# Patient Record
Sex: FEMALE | Race: WHITE | Employment: PART TIME | URBAN - METROPOLITAN AREA
[De-identification: names, ages, dates, MRNs, and addresses within clinical notes are randomized per-mention and may not be internally consistent; named-entity substitution may affect disease eponyms.]

---

## 2018-06-14 ENCOUNTER — HOSPITAL ENCOUNTER (EMERGENCY)
Age: 23
Discharge: HOME | End: 2018-06-14

## 2018-06-14 ENCOUNTER — APPOINTMENT (OUTPATIENT)
Dept: GENERAL RADIOLOGY | Age: 23
End: 2018-06-14

## 2018-06-14 VITALS
DIASTOLIC BLOOD PRESSURE: 88 MMHG | HEIGHT: 60 IN | BODY MASS INDEX: 26.5 KG/M2 | WEIGHT: 135 LBS | RESPIRATION RATE: 16 BRPM | SYSTOLIC BLOOD PRESSURE: 127 MMHG | TEMPERATURE: 98.3 F | HEART RATE: 78 BPM | OXYGEN SATURATION: 98 %

## 2018-06-14 DIAGNOSIS — R05.9 COUGH: Primary | ICD-10-CM

## 2018-06-14 PROCEDURE — 99212 OFFICE O/P EST SF 10 MIN: CPT

## 2018-06-14 PROCEDURE — 71046 X-RAY EXAM CHEST 2 VIEWS: CPT

## 2018-06-14 RX ORDER — CETIRIZINE HYDROCHLORIDE 10 MG/1
10 TABLET ORAL DAILY
COMMUNITY
End: 2018-12-06

## 2018-06-14 RX ORDER — BENZONATATE 100 MG/1
100 CAPSULE ORAL 3 TIMES DAILY PRN
Qty: 21 CAPSULE | Refills: 0 | Status: SHIPPED | OUTPATIENT
Start: 2018-06-14 | End: 2018-06-21

## 2018-06-14 ASSESSMENT — PAIN SCALES - GENERAL: PAINLEVEL_OUTOF10: 5

## 2018-06-14 ASSESSMENT — PAIN DESCRIPTION - DESCRIPTORS: DESCRIPTORS: SORE

## 2018-06-14 ASSESSMENT — PAIN DESCRIPTION - LOCATION: LOCATION: THROAT

## 2018-12-06 ENCOUNTER — OFFICE VISIT (OUTPATIENT)
Dept: FAMILY MEDICINE CLINIC | Age: 23
End: 2018-12-06
Payer: OTHER GOVERNMENT

## 2018-12-06 VITALS
HEART RATE: 70 BPM | DIASTOLIC BLOOD PRESSURE: 78 MMHG | BODY MASS INDEX: 25.86 KG/M2 | RESPIRATION RATE: 12 BRPM | OXYGEN SATURATION: 98 % | SYSTOLIC BLOOD PRESSURE: 118 MMHG | WEIGHT: 137 LBS | TEMPERATURE: 98.6 F | HEIGHT: 61 IN

## 2018-12-06 DIAGNOSIS — Z71.6 ENCOUNTER FOR SMOKING CESSATION COUNSELING: ICD-10-CM

## 2018-12-06 DIAGNOSIS — J40 BRONCHITIS: Primary | ICD-10-CM

## 2018-12-06 DIAGNOSIS — F98.8 ATTENTION DEFICIT DISORDER (ADD) WITHOUT HYPERACTIVITY: ICD-10-CM

## 2018-12-06 PROCEDURE — 99203 OFFICE O/P NEW LOW 30 MIN: CPT | Performed by: NURSE PRACTITIONER

## 2018-12-06 RX ORDER — ALBUTEROL SULFATE 90 UG/1
2 AEROSOL, METERED RESPIRATORY (INHALATION) EVERY 6 HOURS PRN
Qty: 1 INHALER | Refills: 0 | Status: SHIPPED | OUTPATIENT
Start: 2018-12-06 | End: 2019-02-11

## 2018-12-06 RX ORDER — DEXTROAMPHETAMINE SACCHARATE, AMPHETAMINE ASPARTATE MONOHYDRATE, DEXTROAMPHETAMINE SULFATE AND AMPHETAMINE SULFATE 2.5; 2.5; 2.5; 2.5 MG/1; MG/1; MG/1; MG/1
CAPSULE, EXTENDED RELEASE ORAL
Refills: 0 | COMMUNITY
Start: 2018-11-26 | End: 2019-02-11

## 2018-12-06 RX ORDER — FLUCONAZOLE 150 MG/1
150 TABLET ORAL ONCE
Qty: 1 TABLET | Refills: 1 | Status: SHIPPED | OUTPATIENT
Start: 2018-12-06 | End: 2018-12-06

## 2018-12-06 RX ORDER — BUPROPION HYDROCHLORIDE 150 MG/1
150 TABLET, EXTENDED RELEASE ORAL 2 TIMES DAILY
Qty: 60 TABLET | Refills: 3 | Status: SHIPPED | OUTPATIENT
Start: 2018-12-06 | End: 2019-02-11

## 2018-12-06 RX ORDER — AMOXICILLIN AND CLAVULANATE POTASSIUM 875; 125 MG/1; MG/1
1 TABLET, FILM COATED ORAL 2 TIMES DAILY
Qty: 20 TABLET | Refills: 0 | Status: SHIPPED | OUTPATIENT
Start: 2018-12-06 | End: 2018-12-16

## 2018-12-06 ASSESSMENT — PATIENT HEALTH QUESTIONNAIRE - PHQ9
2. FEELING DOWN, DEPRESSED OR HOPELESS: 0
SUM OF ALL RESPONSES TO PHQ QUESTIONS 1-9: 0
1. LITTLE INTEREST OR PLEASURE IN DOING THINGS: 0
SUM OF ALL RESPONSES TO PHQ QUESTIONS 1-9: 0
SUM OF ALL RESPONSES TO PHQ9 QUESTIONS 1 & 2: 0

## 2018-12-06 ASSESSMENT — ENCOUNTER SYMPTOMS
SINUS PRESSURE: 1
COUGH: 1
SHORTNESS OF BREATH: 1
DIARRHEA: 1
VOMITING: 0
SINUS PAIN: 1
WHEEZING: 1
SORE THROAT: 0
NAUSEA: 0

## 2018-12-06 NOTE — LETTER
[facility]  [address]  Phone: [phone]  Fax: [address], APRN - CNP        December 6, 2018     Patient: Eugenie Sandoval   YOB: 1995   Date of Visit: 12/6/2018       To Whom It May Concern: It is my medical opinion that Pulmonx should have no activity or physical training this weekend until Tuesday due to bronchitis. If you have any questions or concerns, please don't hesitate to call.     Sincerely,        Felicia Parisi, APRN - CNP

## 2018-12-06 NOTE — PROGRESS NOTES
amphetamine-dextroamphetamine (ADDERALL XR) 10 MG extended release capsule TAKE ONE CAPSULE BY MOUTH EVERY DAY Yes Historical Provider, MD   buPROPion (WELLBUTRIN SR) 150 MG extended release tablet Take 1 tablet by mouth 2 times daily Start with one tablet daily for 3 days then increase to 2 times daily Yes NEHEMIAS Hicks CNP   albuterol sulfate  (90 Base) MCG/ACT inhaler Inhale 2 puffs into the lungs every 6 hours as needed for Wheezing Yes NEHEMIAS Hicks CNP   amoxicillin-clavulanate (AUGMENTIN) 875-125 MG per tablet Take 1 tablet by mouth 2 times daily for 10 days Yes NEHEMIAS Finnegan CNP       Review of Systems   Constitutional: Positive for fatigue. Negative for activity change, appetite change, chills and diaphoresis. HENT: Positive for congestion, ear pain, postnasal drip, sinus pain and sinus pressure. Negative for ear discharge and sore throat. Respiratory: Positive for cough (worse at night and in morning), shortness of breath and wheezing. Cardiovascular: Negative for chest pain and palpitations. Gastrointestinal: Positive for diarrhea. Negative for nausea and vomiting. Genitourinary: Negative for difficulty urinating. Neurological: Positive for headaches (sinus). Negative for dizziness. Psychiatric/Behavioral: Negative for sleep disturbance. The patient is not nervous/anxious. Physical Exam   Constitutional: She is oriented to person, place, and time. She appears well-developed and well-nourished. HENT:   Head: Normocephalic and atraumatic. TMs with good light reflex bilaterally, nasal turbinates erythematous and boggy, posterior pharynx erythematous and drainage noted. Neck: No tracheal deviation present. No thyromegaly present. Cardiovascular: Normal rate and regular rhythm. No murmur heard. Pulmonary/Chest: Effort normal and breath sounds normal. No respiratory distress. She has no wheezes. She has no rales. Abdominal: Soft.  Bowel

## 2019-02-11 ENCOUNTER — OFFICE VISIT (OUTPATIENT)
Dept: FAMILY MEDICINE CLINIC | Age: 24
End: 2019-02-11
Payer: OTHER GOVERNMENT

## 2019-02-11 VITALS
BODY MASS INDEX: 25.86 KG/M2 | OXYGEN SATURATION: 99 % | RESPIRATION RATE: 16 BRPM | SYSTOLIC BLOOD PRESSURE: 122 MMHG | WEIGHT: 137 LBS | DIASTOLIC BLOOD PRESSURE: 74 MMHG | HEIGHT: 61 IN | TEMPERATURE: 98.3 F | HEART RATE: 88 BPM

## 2019-02-11 DIAGNOSIS — J01.90 ACUTE SINUSITIS, RECURRENCE NOT SPECIFIED, UNSPECIFIED LOCATION: Primary | ICD-10-CM

## 2019-02-11 DIAGNOSIS — J35.01 CHRONIC TONSILLITIS: ICD-10-CM

## 2019-02-11 PROCEDURE — 99213 OFFICE O/P EST LOW 20 MIN: CPT | Performed by: NURSE PRACTITIONER

## 2019-02-11 RX ORDER — DEXTROAMPHETAMINE SACCHARATE, AMPHETAMINE ASPARTATE MONOHYDRATE, DEXTROAMPHETAMINE SULFATE AND AMPHETAMINE SULFATE 5; 5; 5; 5 MG/1; MG/1; MG/1; MG/1
20 CAPSULE, EXTENDED RELEASE ORAL EVERY MORNING
COMMUNITY
End: 2021-03-03

## 2019-02-11 RX ORDER — DEXTROMETHORPHAN HYDROBROMIDE AND PROMETHAZINE HYDROCHLORIDE 15; 6.25 MG/5ML; MG/5ML
5 SYRUP ORAL 4 TIMES DAILY PRN
Qty: 120 ML | Refills: 0 | Status: SHIPPED | OUTPATIENT
Start: 2019-02-11 | End: 2019-02-21

## 2019-02-11 RX ORDER — DOXYCYCLINE HYCLATE 100 MG
100 TABLET ORAL 2 TIMES DAILY
Qty: 14 TABLET | Refills: 0 | Status: SHIPPED | OUTPATIENT
Start: 2019-02-11 | End: 2019-02-18

## 2019-02-11 ASSESSMENT — ENCOUNTER SYMPTOMS
SORE THROAT: 0
SINUS PRESSURE: 1
CONSTIPATION: 0
COUGH: 1
SINUS PAIN: 1
DIARRHEA: 0
CHEST TIGHTNESS: 1
VOMITING: 1
WHEEZING: 0
SHORTNESS OF BREATH: 0
NAUSEA: 0

## 2019-02-11 ASSESSMENT — PATIENT HEALTH QUESTIONNAIRE - PHQ9
2. FEELING DOWN, DEPRESSED OR HOPELESS: 0
SUM OF ALL RESPONSES TO PHQ QUESTIONS 1-9: 0
SUM OF ALL RESPONSES TO PHQ9 QUESTIONS 1 & 2: 0
1. LITTLE INTEREST OR PLEASURE IN DOING THINGS: 0
SUM OF ALL RESPONSES TO PHQ QUESTIONS 1-9: 0

## 2019-02-19 ENCOUNTER — TELEPHONE (OUTPATIENT)
Dept: FAMILY MEDICINE CLINIC | Age: 24
End: 2019-02-19

## 2019-02-19 RX ORDER — BENZONATATE 100 MG/1
100 CAPSULE ORAL 3 TIMES DAILY PRN
Qty: 30 CAPSULE | Refills: 0 | Status: SHIPPED | OUTPATIENT
Start: 2019-02-19 | End: 2019-02-26

## 2019-08-15 ENCOUNTER — APPOINTMENT (OUTPATIENT)
Dept: CT IMAGING | Age: 24
End: 2019-08-15
Payer: OTHER GOVERNMENT

## 2019-08-15 ENCOUNTER — HOSPITAL ENCOUNTER (EMERGENCY)
Age: 24
Discharge: HOME | End: 2019-08-15
Attending: EMERGENCY MEDICINE
Payer: OTHER GOVERNMENT

## 2019-08-15 VITALS
HEIGHT: 63 IN | WEIGHT: 135 LBS | DIASTOLIC BLOOD PRESSURE: 64 MMHG | BODY MASS INDEX: 23.92 KG/M2 | TEMPERATURE: 98.1 F | SYSTOLIC BLOOD PRESSURE: 104 MMHG | HEART RATE: 64 BPM | OXYGEN SATURATION: 99 % | RESPIRATION RATE: 18 BRPM

## 2019-08-15 DIAGNOSIS — J36 PERITONSILLAR ABSCESS: Primary | ICD-10-CM

## 2019-08-15 LAB
ANION GAP SERPL CALCULATED.3IONS-SCNC: 9 MMOL/L (ref 7–16)
BASOPHILS ABSOLUTE: 0.02 E9/L (ref 0–0.2)
BASOPHILS RELATIVE PERCENT: 0.2 % (ref 0–2)
BUN BLDV-MCNC: 11 MG/DL (ref 6–20)
CALCIUM SERPL-MCNC: 9 MG/DL (ref 8.6–10.2)
CHLORIDE BLD-SCNC: 105 MMOL/L (ref 98–107)
CO2: 24 MMOL/L (ref 22–29)
CREAT SERPL-MCNC: 0.7 MG/DL (ref 0.5–1)
EOSINOPHILS ABSOLUTE: 0.29 E9/L (ref 0.05–0.5)
EOSINOPHILS RELATIVE PERCENT: 2.8 % (ref 0–6)
GFR AFRICAN AMERICAN: >60
GFR NON-AFRICAN AMERICAN: >60 ML/MIN/1.73
GLUCOSE BLD-MCNC: 97 MG/DL (ref 74–99)
HCG, URINE, POC: NEGATIVE
HCT VFR BLD CALC: 37.8 % (ref 34–48)
HEMOGLOBIN: 12.8 G/DL (ref 11.5–15.5)
IMMATURE GRANULOCYTES #: 0.05 E9/L
IMMATURE GRANULOCYTES %: 0.5 % (ref 0–5)
LACTIC ACID, SEPSIS: 0.6 MMOL/L (ref 0.5–1.9)
LYMPHOCYTES ABSOLUTE: 2.37 E9/L (ref 1.5–4)
LYMPHOCYTES RELATIVE PERCENT: 23 % (ref 20–42)
Lab: NORMAL
MCH RBC QN AUTO: 32.7 PG (ref 26–35)
MCHC RBC AUTO-ENTMCNC: 33.9 % (ref 32–34.5)
MCV RBC AUTO: 96.4 FL (ref 80–99.9)
MONOCYTES ABSOLUTE: 0.67 E9/L (ref 0.1–0.95)
MONOCYTES RELATIVE PERCENT: 6.5 % (ref 2–12)
NEGATIVE QC PASS/FAIL: NORMAL
NEUTROPHILS ABSOLUTE: 6.91 E9/L (ref 1.8–7.3)
NEUTROPHILS RELATIVE PERCENT: 67 % (ref 43–80)
PDW BLD-RTO: 13.7 FL (ref 11.5–15)
PLATELET # BLD: 351 E9/L (ref 130–450)
PMV BLD AUTO: 8.9 FL (ref 7–12)
POSITIVE QC PASS/FAIL: NORMAL
POTASSIUM REFLEX MAGNESIUM: 3.9 MMOL/L (ref 3.5–5)
RBC # BLD: 3.92 E12/L (ref 3.5–5.5)
SODIUM BLD-SCNC: 138 MMOL/L (ref 132–146)
WBC # BLD: 10.3 E9/L (ref 4.5–11.5)

## 2019-08-15 PROCEDURE — 2500000003 HC RX 250 WO HCPCS: Performed by: EMERGENCY MEDICINE

## 2019-08-15 PROCEDURE — 96365 THER/PROPH/DIAG IV INF INIT: CPT

## 2019-08-15 PROCEDURE — 6360000004 HC RX CONTRAST MEDICATION: Performed by: RADIOLOGY

## 2019-08-15 PROCEDURE — 83605 ASSAY OF LACTIC ACID: CPT

## 2019-08-15 PROCEDURE — 85025 COMPLETE CBC W/AUTO DIFF WBC: CPT

## 2019-08-15 PROCEDURE — 6360000002 HC RX W HCPCS: Performed by: EMERGENCY MEDICINE

## 2019-08-15 PROCEDURE — 99283 EMERGENCY DEPT VISIT LOW MDM: CPT

## 2019-08-15 PROCEDURE — 6370000000 HC RX 637 (ALT 250 FOR IP): Performed by: EMERGENCY MEDICINE

## 2019-08-15 PROCEDURE — 70491 CT SOFT TISSUE NECK W/DYE: CPT

## 2019-08-15 PROCEDURE — 2580000003 HC RX 258: Performed by: RADIOLOGY

## 2019-08-15 PROCEDURE — 96372 THER/PROPH/DIAG INJ SC/IM: CPT

## 2019-08-15 PROCEDURE — 80048 BASIC METABOLIC PNL TOTAL CA: CPT

## 2019-08-15 RX ORDER — CLINDAMYCIN HYDROCHLORIDE 300 MG/1
300 CAPSULE ORAL 3 TIMES DAILY
Qty: 30 CAPSULE | Refills: 0 | Status: SHIPPED | OUTPATIENT
Start: 2019-08-15 | End: 2019-08-25

## 2019-08-15 RX ORDER — DEXAMETHASONE SODIUM PHOSPHATE 10 MG/ML
10 INJECTION INTRAMUSCULAR; INTRAVENOUS ONCE
Status: COMPLETED | OUTPATIENT
Start: 2019-08-15 | End: 2019-08-15

## 2019-08-15 RX ORDER — CLINDAMYCIN PHOSPHATE 600 MG/50ML
600 INJECTION INTRAVENOUS ONCE
Status: COMPLETED | OUTPATIENT
Start: 2019-08-15 | End: 2019-08-15

## 2019-08-15 RX ORDER — HYDROCODONE BITARTRATE AND ACETAMINOPHEN 5; 325 MG/1; MG/1
1 TABLET ORAL ONCE
Status: COMPLETED | OUTPATIENT
Start: 2019-08-15 | End: 2019-08-15

## 2019-08-15 RX ORDER — PREDNISONE 10 MG/1
TABLET ORAL
Qty: 20 TABLET | Refills: 0 | Status: SHIPPED | OUTPATIENT
Start: 2019-08-15 | End: 2019-08-25

## 2019-08-15 RX ORDER — SODIUM CHLORIDE 0.9 % (FLUSH) 0.9 %
10 SYRINGE (ML) INJECTION PRN
Status: COMPLETED | OUTPATIENT
Start: 2019-08-15 | End: 2019-08-15

## 2019-08-15 RX ORDER — GREEN TEA/HOODIA GORDONII 315-12.5MG
1 CAPSULE ORAL DAILY
Qty: 30 TABLET | Refills: 0 | Status: SHIPPED | OUTPATIENT
Start: 2019-08-15 | End: 2019-08-30

## 2019-08-15 RX ADMIN — CLINDAMYCIN PHOSPHATE 600 MG: 600 INJECTION, SOLUTION INTRAVENOUS at 23:07

## 2019-08-15 RX ADMIN — HYDROCODONE BITARTRATE AND ACETAMINOPHEN 1 TABLET: 5; 325 TABLET ORAL at 21:31

## 2019-08-15 RX ADMIN — Medication 10 ML: at 21:55

## 2019-08-15 RX ADMIN — IOPAMIDOL 70 ML: 755 INJECTION, SOLUTION INTRAVENOUS at 21:55

## 2019-08-15 RX ADMIN — DEXAMETHASONE SODIUM PHOSPHATE 10 MG: 10 INJECTION INTRAMUSCULAR; INTRAVENOUS at 22:59

## 2019-08-15 ASSESSMENT — ENCOUNTER SYMPTOMS
NAUSEA: 1
TROUBLE SWALLOWING: 0
SINUS PRESSURE: 0
VOMITING: 0
SHORTNESS OF BREATH: 0
COUGH: 0
RHINORRHEA: 0
ABDOMINAL PAIN: 0
COLOR CHANGE: 0
VOICE CHANGE: 1
SORE THROAT: 1
DIARRHEA: 0
WHEEZING: 0
EYE PAIN: 0
EYE REDNESS: 0

## 2019-08-15 ASSESSMENT — PAIN DESCRIPTION - DESCRIPTORS: DESCRIPTORS: SORE

## 2019-08-15 ASSESSMENT — PAIN DESCRIPTION - ONSET: ONSET: ON-GOING

## 2019-08-15 ASSESSMENT — PAIN DESCRIPTION - FREQUENCY: FREQUENCY: CONTINUOUS

## 2019-08-15 ASSESSMENT — PAIN SCALES - GENERAL
PAINLEVEL_OUTOF10: 5
PAINLEVEL_OUTOF10: 2
PAINLEVEL_OUTOF10: 8

## 2019-08-15 ASSESSMENT — PAIN DESCRIPTION - PROGRESSION: CLINICAL_PROGRESSION: GRADUALLY IMPROVING

## 2019-08-15 ASSESSMENT — PAIN DESCRIPTION - LOCATION: LOCATION: THROAT

## 2019-08-15 ASSESSMENT — PAIN DESCRIPTION - PAIN TYPE: TYPE: ACUTE PAIN

## 2019-08-15 ASSESSMENT — PAIN DESCRIPTION - ORIENTATION: ORIENTATION: LEFT

## 2019-08-16 ENCOUNTER — TELEPHONE (OUTPATIENT)
Dept: ENT CLINIC | Age: 24
End: 2019-08-16

## 2019-08-19 NOTE — TELEPHONE ENCOUNTER
Spoke with patient on Friday-she was doing better and apt scheduled for Tuesday per Dr. Brendan Oliveira.

## 2019-08-20 ENCOUNTER — OFFICE VISIT (OUTPATIENT)
Dept: ENT CLINIC | Age: 24
End: 2019-08-20
Payer: OTHER GOVERNMENT

## 2019-08-20 VITALS
HEIGHT: 61 IN | BODY MASS INDEX: 25.49 KG/M2 | WEIGHT: 135 LBS | HEART RATE: 108 BPM | SYSTOLIC BLOOD PRESSURE: 122 MMHG | DIASTOLIC BLOOD PRESSURE: 83 MMHG

## 2019-08-20 DIAGNOSIS — J35.01 CHRONIC TONSILLITIS: Primary | ICD-10-CM

## 2019-08-20 PROCEDURE — 99204 OFFICE O/P NEW MOD 45 MIN: CPT | Performed by: OTOLARYNGOLOGY

## 2019-08-20 ASSESSMENT — ENCOUNTER SYMPTOMS
NAUSEA: 0
VOICE CHANGE: 0
PHOTOPHOBIA: 0
STRIDOR: 0
VOMITING: 0
SHORTNESS OF BREATH: 0
SORE THROAT: 0

## 2019-08-20 NOTE — PATIENT INSTRUCTIONS
Thank you for choosing our [facility] or [place]  [facility]. We are committed to your medical treatment and  care. If you need to reschedule or cancel your surgery or follow up  appointment, please call the surgery scheduler at [phone] option 3. INSTRUCTIONS FOR SURGERY 10/24/2019    Nothing to eat or drink after midnight the night before surgery unless surgery is at [facility] or otherwise instructed by the hospital.    DO NOT TAKE ANY ASPIRIN PRODUCTS 7 days prior to surgery-unless required by your cardiologist or primary care physician. Tylenol only. No Advil, Motrin, Aleve, or Ibuprofen    Any illegal drugs in your system (including Marijuana even if legally prescribed) will result in your surgery being cancelled. Please be sure to check with our office or the hospital on time frame for the drugs to be out of your system. Should your insurance change at any time you must contact our office. Failure to do so may result in your surgery being rescheduled. If you need paperwork filled out for work, you must give the office 2 weeks to complete and submit the forms.     [address], [address] [facility] will call you a couple days prior to your surgery and give you further instructions, if any questions call them at [phone]

## 2019-10-21 ENCOUNTER — TELEPHONE (OUTPATIENT)
Dept: ENT CLINIC | Age: 24
End: 2019-10-21

## 2020-11-13 ENCOUNTER — OFFICE VISIT (OUTPATIENT)
Dept: ENT CLINIC | Age: 25
End: 2020-11-13
Payer: OTHER GOVERNMENT

## 2020-11-13 VITALS — HEIGHT: 61 IN | WEIGHT: 140 LBS | BODY MASS INDEX: 26.43 KG/M2 | TEMPERATURE: 97.5 F

## 2020-11-13 PROCEDURE — 99213 OFFICE O/P EST LOW 20 MIN: CPT | Performed by: OTOLARYNGOLOGY

## 2020-11-13 ASSESSMENT — ENCOUNTER SYMPTOMS
COLOR CHANGE: 0
EYE DISCHARGE: 0
APNEA: 0
VOMITING: 0
NAUSEA: 0
PHOTOPHOBIA: 0
GASTROINTESTINAL NEGATIVE: 1
STRIDOR: 0
EYE PAIN: 0
SORE THROAT: 1
ABDOMINAL PAIN: 0
SHORTNESS OF BREATH: 0
VOICE CHANGE: 0
DIARRHEA: 0
EYES NEGATIVE: 1
RESPIRATORY NEGATIVE: 1
CHEST TIGHTNESS: 0

## 2020-11-13 NOTE — PROGRESS NOTES
Subjective:      Patient ID:  Axel Chang is a 30 y.o. female. HPI:    Patient presents today for recheck of her tonsil surgery, she was cancelled with covid earlier this year. No changes and would like them out. . Condition has been present for 1 year(s). Pt has had infections since the last visit and would llike to get the tonsils removed. Also having issues breathing through her nose and would like to talk about getting that addressed.        Past Medical History:   Diagnosis Date    ADHD (attention deficit hyperactivity disorder)      Past Surgical History:   Procedure Laterality Date    BREAST SURGERY      reduction     Family History   Problem Relation Age of Onset    Heart Disease Mother     Other Mother     Substance Abuse Mother     Other Sister         tremors    Stroke Maternal Grandmother     Heart Disease Maternal Grandfather     Other Maternal Grandfather         alzheimers     Social History     Socioeconomic History    Marital status: Single     Spouse name: None    Number of children: None    Years of education: None    Highest education level: None   Occupational History    None   Social Needs    Financial resource strain: None    Food insecurity     Worry: None     Inability: None    Transportation needs     Medical: None     Non-medical: None   Tobacco Use    Smoking status: Current Every Day Smoker     Packs/day: 0.50     Years: 4.00     Pack years: 2.00     Types: Cigarettes    Smokeless tobacco: Never Used   Substance and Sexual Activity    Alcohol use: Not Currently     Alcohol/week: 4.0 standard drinks     Types: 4 Glasses of wine per week    Drug use: No    Sexual activity: Never   Lifestyle    Physical activity     Days per week: None     Minutes per session: None    Stress: None   Relationships    Social connections     Talks on phone: None     Gets together: None     Attends Holiness service: None     Active member of club or organization: None     Attends meetings of clubs or organizations: None     Relationship status: None    Intimate partner violence     Fear of current or ex partner: None     Emotionally abused: None     Physically abused: None     Forced sexual activity: None   Other Topics Concern    None   Social History Narrative    None     Allergies   Allergen Reactions    Amoxicillin Rash       Review of Systems   Constitutional: Negative. Negative for appetite change, chills and fever. HENT: Positive for congestion and sore throat. Negative for voice change. Eyes: Negative. Negative for photophobia, pain, discharge and visual disturbance. Respiratory: Negative. Negative for apnea, chest tightness, shortness of breath and stridor. Cardiovascular: Negative. Negative for chest pain, palpitations and leg swelling. Gastrointestinal: Negative. Negative for abdominal pain, diarrhea, nausea and vomiting. Endocrine: Negative for cold intolerance, heat intolerance and polydipsia. Genitourinary: Negative. Negative for dysuria, flank pain and hematuria. Musculoskeletal: Negative. Negative for arthralgias, gait problem and neck pain. Skin: Negative. Negative for color change, pallor, rash and wound. Allergic/Immunologic: Negative for environmental allergies, food allergies and immunocompromised state. Neurological: Negative. Negative for dizziness, numbness and headaches. Hematological: Negative for adenopathy. Psychiatric/Behavioral: Negative. Negative for agitation, behavioral problems and hallucinations. All other systems reviewed and are negative. Objective:     Vitals:    11/13/20 0844   Temp: 97.5 °F (36.4 °C)     Physical Exam  Vitals signs and nursing note reviewed. Constitutional:       Appearance: She is well-developed. HENT:      Head: Normocephalic and atraumatic.       Right Ear: Hearing, tympanic membrane, ear canal and external ear normal.      Left Ear: Hearing, tympanic membrane, ear canal and external ear normal.      Nose: Nose normal.      Mouth/Throat:      Lips: Pink. Mouth: Mucous membranes are moist. Injury present. Tongue: No lesions. Palate: No mass. Tonsils: No tonsillar exudate. 3+ on the right. 3+ on the left. Eyes:      Conjunctiva/sclera: Conjunctivae normal.      Pupils: Pupils are equal, round, and reactive to light. Neck:      Musculoskeletal: Normal range of motion and neck supple. Cardiovascular:      Rate and Rhythm: Normal rate and regular rhythm. Heart sounds: Normal heart sounds. Pulmonary:      Effort: Pulmonary effort is normal.      Breath sounds: Normal breath sounds. Abdominal:      General: Bowel sounds are normal.      Palpations: Abdomen is soft. Skin:     General: Skin is warm and dry. Neurological:      Mental Status: She is alert and oriented to person, place, and time. Assessment:       Diagnosis Orders   1. DNS (deviated nasal septum)     2. Chronic tonsillitis                Plan:      I recommend:    Tonsillectomy and adenoidectomy. I will not keep the patient overnight for observation after surgery  The procedure risks and benefits were discussed with the patient and family including:      --Bleeding occurs in 1 to 4% of patients  --Poor speech (hyper nasal speech) occurs in 1/3000 patients. --Nasopharyngeal Stenosis  --Chipped Teeth  --Electrocautery Ahn  --Death      Pt and family understood and decided to proceed with the surgery. Follow up 2 weeks after surgery         I also discussed septoplasty with pt because she is having a hard time running and getting enough air in her nose so we will discuss this at the post op visit and possibly set surgery up after that.

## 2020-11-13 NOTE — LETTER
[facility]  [address] [facility]  [address] [address]  Phone: [phone]  Fax: [address],         November 13, 2020     Patient: Kadie Lopez   YOB: 1995   Date of Visit: 11/13/2020       To Whom it May Concern:    Apolinar Hough was seen in my clinic on 11/13/2020. She is scheduled for surgery January 18,2020. Patient will follow up February 2,2020. Patient is restricted to no bending, lifting over 10lbs, no strenous activity, physical labor from 11/13/2020-2/2/2020. If you have any questions or concerns, please don't hesitate to call.     Sincerely,     Sánchez Dominique DO

## 2020-11-13 NOTE — PATIENT INSTRUCTIONS
[facility] practice. We are committed to your medical treatment and  care. If you need to reschedule or cancel your surgery or follow up  appointment, please call the surgery scheduler at [phone]. INSTRUCTIONS FOR SURGERY Tonsillectomy & Adenoidectomy    Nothing to eat or drink after midnight the night before surgery unless surgery is at [facility] or otherwise instructed by the hospital.    DO NOT TAKE ANY ASPIRIN PRODUCTS 7 days prior to surgery-unless required by your cardiologist or primary care physician. Tylenol only. No Advil, Motrin, Aleve, or Ibuprofen    Any illegal drugs in your system (including Marijuana even if legally prescribed) will result in your surgery being cancelled. Please be sure to check with our office or the hospital on time frame for the drugs to be out of your system. Should your insurance change at any time you must contact our office. Failure to do so may result in your surgery being rescheduled. If you need paperwork filled out for work, you must give the office 2 weeks to complete and submit the forms.       [address], [address] [facility] you a couple days prior to your surgery and give you further instructions, if any questions call them at [phone]

## 2021-02-08 ENCOUNTER — TELEPHONE (OUTPATIENT)
Dept: ENT CLINIC | Age: 26
End: 2021-02-08

## 2021-02-08 NOTE — TELEPHONE ENCOUNTER
Prior Authorization Form:      DEMOGRAPHICS:                     Patient Name:  Debora Meeks  Patient :  1995            Insurance:  Payor:  EAST / Plan:  EAST  / Product Type: *No Product type* /   Insurance ID Number:    Payor/Plan Subscr  Sex Relation Sub.  Ins. ID Effective Group Num   1. ARMANDO MAE 1995 Female Self [id number]                                    [address]         DIAGNOSIS & PROCEDURE:                       Procedure/Operation: Tonsillectomy/Adenoidectomy           CPT Code: 61720    Diagnosis:  Chronic Tonsillitis,Adenoid Hypertrophy    ICD10 Code: J35.01    Location:  [address] [place] [address]    Surgeon:  Bethany Ocampo INFORMATION:                          Date: 3/8/2021    Time: N/A              Anesthesia:  General                                                       Status:  Outpatient   Outpatient       Special Comments:  N/A       Electronically signed by Abby Thacker MA on 2021 at 3:48 PM

## 2021-03-03 ENCOUNTER — HOSPITAL ENCOUNTER (OUTPATIENT)
Age: 26
Discharge: HOME | End: 2021-03-05
Payer: OTHER GOVERNMENT

## 2021-03-03 DIAGNOSIS — Z01.818 PREOP TESTING: ICD-10-CM

## 2021-03-03 PROCEDURE — U0003 INFECTIOUS AGENT DETECTION BY NUCLEIC ACID (DNA OR RNA); SEVERE ACUTE RESPIRATORY SYNDROME CORONAVIRUS 2 (SARS-COV-2) (CORONAVIRUS DISEASE [COVID-19]), AMPLIFIED PROBE TECHNIQUE, MAKING USE OF HIGH THROUGHPUT TECHNOLOGIES AS DESCRIBED BY CMS-2020-01-R: HCPCS

## 2021-03-03 RX ORDER — IBUPROFEN 200 MG
200 TABLET ORAL EVERY 6 HOURS PRN
COMMUNITY

## 2021-03-03 NOTE — PROGRESS NOTES
Cb PRE-ADMISSION TESTING INSTRUCTIONS    The Preadmission Testing patient is instructed accordingly using the following criteria (check applicable):    ARRIVAL INSTRUCTIONS:  [x] Parking the day of Surgery is located in the Main Entrance lot. Upon entering the door, make an immediate right to the surgery reception desk    [x] Bring photo ID and insurance card    [] Bring in a copy of Living will or Durable Power of  papers. [x] Please be sure to arrange for responsible adult to provide transportation to and from the hospital    [x] Please arrange for responsible adult to be with you for the 24 hour period post procedure due to having anesthesia      GENERAL INSTRUCTIONS:    [x] Nothing by mouth after midnight, including gum, candy, mints or water    [x] You may brush your teeth, but do not swallow any water    [x] Take medications as instructed with 1-2 oz of water    [] Stop herbal supplements and vitamins 5 days prior to procedure    [x] Follow preop dosing of blood thinners per physician instructions    [] Take 1/2 dose of evening insulin, but no insulin after midnight    [] No oral diabetic medications after midnight    [] If diabetic and have low blood sugar or feel symptomatic, take 1-2oz apple juice only    [] Bring inhalers day of surgery    [] Bring C-PAP/ Bi-Pap day of surgery    [x] Bring urine specimen day of surgery    [x] Shower or bath with soap, lather and rinse well, AM of Surgery, no lotion, powders or creams to surgical site    [x] Follow bowel prep as instructed per surgeon    [x] No tobacco products within 24 hours of surgery     [x] No alcohol or illegal drug use within 24 hours of surgery.     [x] Jewelry, body piercing's, eyeglasses, contact lenses and dentures are not permitted into surgery (bring cases)      [x] Please do not wear any nail polish, make up or hair products on the day of surgery    [x] You can expect a call the business day prior to procedure to notify you if your arrival time changes    [x] If you receive a survey after surgery we would greatly appreciate your comments    [] Parent/guardian of a minor must accompany their child and remain on the premises  the entire time they are under our care     [] Pediatric patients may bring favorite toy, blanket or comfort item with them    [] A caregiver or family member must remain with the patient during their stay if they are mentally handicapped, have dementia, disoriented or unable to use a call light or would be a safety concern if left unattended    [x] Please notify surgeon if you develop any illness between now and time of surgery (cold, cough, sore throat, fever, nausea, vomiting) or any signs of infections  including skin, wounds, and dental.    [x]  The Outpatient Pharmacy is available to fill your prescription here on your day of surgery, ask your preop nurse for details    [x] Other instructions: Wear comfortable clothing    EDUCATIONAL MATERIALS PROVIDED:    [] PAT Preoperative Education Packet/Booklet     [] Medication List    [] Transfusion bracelet applied with instructions    [] Shower with soap, lather and rinse well, and use CHG wipes provided the evening before surgery as instructed    [] Incentive spirometer with instructions        Have you been tested for COVID  No (to be done 3-3-21)           Have you been told you were positive for COVID No  Have you had any known exposure to someone that is positive for COVID No  Do you have a cough                   No              Do you have shortness of breath No                 Do you have a sore throat            No                Are you having chills                    No                Are you having muscle aches. No                    Please come to the hospital wearing a mask and have your significant other wear a mask as well.   Both of you should check your temperature before leaving to come here,  if it is 100 or higher please call [phone] for instruction.

## 2021-03-04 LAB
SARS-COV-2: NOT DETECTED
SOURCE: NORMAL

## 2021-03-07 ASSESSMENT — ENCOUNTER SYMPTOMS
SHORTNESS OF BREATH: 0
VOICE CHANGE: 0
STRIDOR: 0
PHOTOPHOBIA: 0
VOMITING: 0
APNEA: 0
EYE DISCHARGE: 0
EYE PAIN: 0
DIARRHEA: 0
NAUSEA: 0
RESPIRATORY NEGATIVE: 1
SORE THROAT: 1
CHEST TIGHTNESS: 0
GASTROINTESTINAL NEGATIVE: 1
ABDOMINAL PAIN: 0
EYES NEGATIVE: 1
COLOR CHANGE: 0

## 2021-03-07 NOTE — H&P
Subjective:      Patient ID:  Jaclynn Soulier is a 30 y.o. female. HPI:    Patient presents today for recheck of her tonsil surgery, she was cancelled with covid earlier this year. No changes and would like them out. . Condition has been present for 1 year(s). Pt has had infections since the last visit and would llike to get the tonsils removed. Also having issues breathing through her nose and would like to talk about getting that addressed.        Past Medical History:   Diagnosis Date    ADHD (attention deficit hyperactivity disorder)     Peritonsillar abscess      Past Surgical History:   Procedure Laterality Date    BREAST SURGERY  2013    reduction     Family History   Problem Relation Age of Onset    Heart Disease Mother     Other Mother     Substance Abuse Mother     Other Sister         tremors    Stroke Maternal Grandmother     Heart Disease Maternal Grandfather     Other Maternal Grandfather         alzheimers     Social History     Socioeconomic History    Marital status: Single     Spouse name: None    Number of children: None    Years of education: None    Highest education level: None   Occupational History    None   Social Needs    Financial resource strain: None    Food insecurity     Worry: None     Inability: None    Transportation needs     Medical: None     Non-medical: None   Tobacco Use    Smoking status: Current Every Day Smoker     Packs/day: 0.50     Years: 4.00     Pack years: 2.00     Types: Cigarettes    Smokeless tobacco: Never Used   Substance and Sexual Activity    Alcohol use: Yes     Comment: social    Drug use: No    Sexual activity: Never   Lifestyle    Physical activity     Days per week: None     Minutes per session: None    Stress: None   Relationships    Social connections     Talks on phone: None     Gets together: None     Attends Taoism service: None     Active member of club or organization: None     Attends meetings of clubs or organizations: None     Relationship status: None    Intimate partner violence     Fear of current or ex partner: None     Emotionally abused: None     Physically abused: None     Forced sexual activity: None   Other Topics Concern    None   Social History Narrative    None     Allergies   Allergen Reactions    Amoxicillin Rash       Review of Systems   Constitutional: Negative. Negative for appetite change, chills and fever. HENT: Positive for congestion and sore throat. Negative for voice change. Eyes: Negative. Negative for photophobia, pain, discharge and visual disturbance. Respiratory: Negative. Negative for apnea, chest tightness, shortness of breath and stridor. Cardiovascular: Negative. Negative for chest pain, palpitations and leg swelling. Gastrointestinal: Negative. Negative for abdominal pain, diarrhea, nausea and vomiting. Endocrine: Negative for cold intolerance, heat intolerance and polydipsia. Genitourinary: Negative. Negative for dysuria, flank pain and hematuria. Musculoskeletal: Negative. Negative for arthralgias, gait problem and neck pain. Skin: Negative. Negative for color change, pallor, rash and wound. Allergic/Immunologic: Negative for environmental allergies, food allergies and immunocompromised state. Neurological: Negative. Negative for dizziness, numbness and headaches. Hematological: Negative for adenopathy. Psychiatric/Behavioral: Negative. Negative for agitation, behavioral problems and hallucinations. All other systems reviewed and are negative. Objective: There were no vitals filed for this visit. Physical Exam  Vitals signs and nursing note reviewed. Constitutional:       Appearance: She is well-developed. HENT:      Head: Normocephalic and atraumatic.       Right Ear: Hearing, tympanic membrane, ear canal and external ear normal.      Left Ear: Hearing, tympanic membrane, ear canal and external ear normal.      Nose: Nose normal. Mouth/Throat:      Lips: Pink. Mouth: Mucous membranes are moist. Injury present. Tongue: No lesions. Palate: No mass. Tonsils: No tonsillar exudate. 3+ on the right. 3+ on the left. Eyes:      Conjunctiva/sclera: Conjunctivae normal.      Pupils: Pupils are equal, round, and reactive to light. Neck:      Musculoskeletal: Normal range of motion and neck supple. Cardiovascular:      Rate and Rhythm: Normal rate and regular rhythm. Heart sounds: Normal heart sounds. Pulmonary:      Effort: Pulmonary effort is normal.      Breath sounds: Normal breath sounds. Abdominal:      General: Bowel sounds are normal.      Palpations: Abdomen is soft. Skin:     General: Skin is warm and dry. Neurological:      Mental Status: She is alert and oriented to person, place, and time. Assessment:       Diagnosis Orders   1. DNS (deviated nasal septum)     2. Chronic tonsillitis                Plan:      I recommend:    Tonsillectomy and adenoidectomy. I will not keep the patient overnight for observation after surgery  The procedure risks and benefits were discussed with the patient and family including:      --Bleeding occurs in 1 to 4% of patients  --Poor speech (hyper nasal speech) occurs in 1/3000 patients. --Nasopharyngeal Stenosis  --Chipped Teeth  --Electrocautery Ahn  --Death      Pt and family understood and decided to proceed with the surgery. Follow up 2 weeks after surgery         I also discussed septoplasty with pt because she is having a hard time running and getting enough air in her nose so we will discuss this at the post op visit and possibly set surgery up after that.

## 2021-03-08 ENCOUNTER — HOSPITAL ENCOUNTER (OUTPATIENT)
Age: 26
Setting detail: OUTPATIENT SURGERY
Discharge: HOME | End: 2021-03-08
Attending: OTOLARYNGOLOGY | Admitting: OTOLARYNGOLOGY
Payer: OTHER GOVERNMENT

## 2021-03-08 ENCOUNTER — ANESTHESIA EVENT (OUTPATIENT)
Dept: OPERATING ROOM | Age: 26
End: 2021-03-08
Payer: OTHER GOVERNMENT

## 2021-03-08 ENCOUNTER — ANESTHESIA (OUTPATIENT)
Dept: OPERATING ROOM | Age: 26
End: 2021-03-08
Payer: OTHER GOVERNMENT

## 2021-03-08 VITALS
WEIGHT: 140 LBS | TEMPERATURE: 97.6 F | OXYGEN SATURATION: 95 % | HEIGHT: 61 IN | HEART RATE: 62 BPM | RESPIRATION RATE: 16 BRPM | DIASTOLIC BLOOD PRESSURE: 64 MMHG | BODY MASS INDEX: 26.43 KG/M2 | SYSTOLIC BLOOD PRESSURE: 126 MMHG

## 2021-03-08 VITALS — SYSTOLIC BLOOD PRESSURE: 137 MMHG | DIASTOLIC BLOOD PRESSURE: 98 MMHG | OXYGEN SATURATION: 88 %

## 2021-03-08 DIAGNOSIS — Z01.818 PREOP TESTING: Primary | ICD-10-CM

## 2021-03-08 DIAGNOSIS — G89.18 POST-OP PAIN: ICD-10-CM

## 2021-03-08 LAB
HCG, URINE, POC: NEGATIVE
Lab: NORMAL
NEGATIVE QC PASS/FAIL: NORMAL
POSITIVE QC PASS/FAIL: NORMAL

## 2021-03-08 PROCEDURE — 7100000001 HC PACU RECOVERY - ADDTL 15 MIN: Performed by: OTOLARYNGOLOGY

## 2021-03-08 PROCEDURE — 2580000003 HC RX 258: Performed by: NURSE ANESTHETIST, CERTIFIED REGISTERED

## 2021-03-08 PROCEDURE — 6360000002 HC RX W HCPCS: Performed by: NURSE ANESTHETIST, CERTIFIED REGISTERED

## 2021-03-08 PROCEDURE — 7100000011 HC PHASE II RECOVERY - ADDTL 15 MIN: Performed by: OTOLARYNGOLOGY

## 2021-03-08 PROCEDURE — 2720000010 HC SURG SUPPLY STERILE: Performed by: OTOLARYNGOLOGY

## 2021-03-08 PROCEDURE — 3700000000 HC ANESTHESIA ATTENDED CARE: Performed by: OTOLARYNGOLOGY

## 2021-03-08 PROCEDURE — 3600000012 HC SURGERY LEVEL 2 ADDTL 15MIN: Performed by: OTOLARYNGOLOGY

## 2021-03-08 PROCEDURE — 88304 TISSUE EXAM BY PATHOLOGIST: CPT

## 2021-03-08 PROCEDURE — 3600000002 HC SURGERY LEVEL 2 BASE: Performed by: OTOLARYNGOLOGY

## 2021-03-08 PROCEDURE — 6370000000 HC RX 637 (ALT 250 FOR IP): Performed by: ANESTHESIOLOGY

## 2021-03-08 PROCEDURE — 42826 REMOVAL OF TONSILS: CPT | Performed by: OTOLARYNGOLOGY

## 2021-03-08 PROCEDURE — 2709999900 HC NON-CHARGEABLE SUPPLY: Performed by: OTOLARYNGOLOGY

## 2021-03-08 PROCEDURE — 3700000001 HC ADD 15 MINUTES (ANESTHESIA): Performed by: OTOLARYNGOLOGY

## 2021-03-08 PROCEDURE — 6360000002 HC RX W HCPCS: Performed by: ANESTHESIOLOGY

## 2021-03-08 PROCEDURE — 7100000000 HC PACU RECOVERY - FIRST 15 MIN: Performed by: OTOLARYNGOLOGY

## 2021-03-08 PROCEDURE — 7100000010 HC PHASE II RECOVERY - FIRST 15 MIN: Performed by: OTOLARYNGOLOGY

## 2021-03-08 PROCEDURE — 2500000003 HC RX 250 WO HCPCS: Performed by: NURSE ANESTHETIST, CERTIFIED REGISTERED

## 2021-03-08 RX ORDER — ALBUTEROL SULFATE 2.5 MG/3ML
2.5 SOLUTION RESPIRATORY (INHALATION)
Status: DISCONTINUED | OUTPATIENT
Start: 2021-03-08 | End: 2021-03-08

## 2021-03-08 RX ORDER — ROCURONIUM BROMIDE 10 MG/ML
INJECTION, SOLUTION INTRAVENOUS PRN
Status: DISCONTINUED | OUTPATIENT
Start: 2021-03-08 | End: 2021-03-08 | Stop reason: SDUPTHER

## 2021-03-08 RX ORDER — NEOSTIGMINE METHYLSULFATE 1 MG/ML
INJECTION, SOLUTION INTRAVENOUS PRN
Status: DISCONTINUED | OUTPATIENT
Start: 2021-03-08 | End: 2021-03-08 | Stop reason: SDUPTHER

## 2021-03-08 RX ORDER — FENTANYL CITRATE 50 UG/ML
INJECTION, SOLUTION INTRAMUSCULAR; INTRAVENOUS PRN
Status: DISCONTINUED | OUTPATIENT
Start: 2021-03-08 | End: 2021-03-08 | Stop reason: SDUPTHER

## 2021-03-08 RX ORDER — LIDOCAINE HYDROCHLORIDE 20 MG/ML
INJECTION, SOLUTION EPIDURAL; INFILTRATION; INTRACAUDAL; PERINEURAL PRN
Status: DISCONTINUED | OUTPATIENT
Start: 2021-03-08 | End: 2021-03-08 | Stop reason: SDUPTHER

## 2021-03-08 RX ORDER — SODIUM CHLORIDE 0.9 % (FLUSH) 0.9 %
10 SYRINGE (ML) INJECTION PRN
Status: DISCONTINUED | OUTPATIENT
Start: 2021-03-08 | End: 2021-03-08 | Stop reason: SDUPTHER

## 2021-03-08 RX ORDER — SODIUM CHLORIDE 0.9 % (FLUSH) 0.9 %
10 SYRINGE (ML) INJECTION EVERY 12 HOURS SCHEDULED
Status: DISCONTINUED | OUTPATIENT
Start: 2021-03-08 | End: 2021-03-08 | Stop reason: SDUPTHER

## 2021-03-08 RX ORDER — MEPERIDINE HYDROCHLORIDE 25 MG/ML
12.5 INJECTION INTRAMUSCULAR; INTRAVENOUS; SUBCUTANEOUS EVERY 5 MIN PRN
Status: DISCONTINUED | OUTPATIENT
Start: 2021-03-08 | End: 2021-03-08 | Stop reason: HOSPADM

## 2021-03-08 RX ORDER — HYDROCODONE BITARTRATE AND ACETAMINOPHEN 7.5; 325 MG/1; MG/1
1 TABLET ORAL EVERY 6 HOURS PRN
Qty: 28 TABLET | Refills: 0 | Status: SHIPPED | OUTPATIENT
Start: 2021-03-08 | End: 2021-03-15

## 2021-03-08 RX ORDER — SODIUM CHLORIDE 0.9 % (FLUSH) 0.9 %
10 SYRINGE (ML) INJECTION EVERY 12 HOURS SCHEDULED
Status: DISCONTINUED | OUTPATIENT
Start: 2021-03-08 | End: 2021-03-08 | Stop reason: HOSPADM

## 2021-03-08 RX ORDER — SODIUM CHLORIDE 0.9 % (FLUSH) 0.9 %
10 SYRINGE (ML) INJECTION PRN
Status: DISCONTINUED | OUTPATIENT
Start: 2021-03-08 | End: 2021-03-08 | Stop reason: HOSPADM

## 2021-03-08 RX ORDER — ONDANSETRON 2 MG/ML
4 INJECTION INTRAMUSCULAR; INTRAVENOUS
Status: DISCONTINUED | OUTPATIENT
Start: 2021-03-08 | End: 2021-03-08 | Stop reason: HOSPADM

## 2021-03-08 RX ORDER — ONDANSETRON 2 MG/ML
INJECTION INTRAMUSCULAR; INTRAVENOUS PRN
Status: DISCONTINUED | OUTPATIENT
Start: 2021-03-08 | End: 2021-03-08 | Stop reason: SDUPTHER

## 2021-03-08 RX ORDER — SODIUM CHLORIDE 9 MG/ML
INJECTION, SOLUTION INTRAVENOUS CONTINUOUS PRN
Status: DISCONTINUED | OUTPATIENT
Start: 2021-03-08 | End: 2021-03-08 | Stop reason: SDUPTHER

## 2021-03-08 RX ORDER — MIDAZOLAM HYDROCHLORIDE 1 MG/ML
INJECTION INTRAMUSCULAR; INTRAVENOUS PRN
Status: DISCONTINUED | OUTPATIENT
Start: 2021-03-08 | End: 2021-03-08 | Stop reason: SDUPTHER

## 2021-03-08 RX ORDER — DEXAMETHASONE SODIUM PHOSPHATE 10 MG/ML
8 INJECTION, SOLUTION INTRAMUSCULAR; INTRAVENOUS ONCE
Status: DISCONTINUED | OUTPATIENT
Start: 2021-03-08 | End: 2021-03-08 | Stop reason: HOSPADM

## 2021-03-08 RX ORDER — HYDROCODONE BITARTRATE AND ACETAMINOPHEN 5; 325 MG/1; MG/1
1 TABLET ORAL PRN
Status: DISCONTINUED | OUTPATIENT
Start: 2021-03-08 | End: 2021-03-08 | Stop reason: HOSPADM

## 2021-03-08 RX ORDER — FENTANYL CITRATE 50 UG/ML
25 INJECTION, SOLUTION INTRAMUSCULAR; INTRAVENOUS EVERY 5 MIN PRN
Status: DISCONTINUED | OUTPATIENT
Start: 2021-03-08 | End: 2021-03-08 | Stop reason: HOSPADM

## 2021-03-08 RX ORDER — GLYCOPYRROLATE 1 MG/5 ML
SYRINGE (ML) INTRAVENOUS PRN
Status: DISCONTINUED | OUTPATIENT
Start: 2021-03-08 | End: 2021-03-08 | Stop reason: SDUPTHER

## 2021-03-08 RX ORDER — HYDROCODONE BITARTRATE AND ACETAMINOPHEN 5; 325 MG/1; MG/1
2 TABLET ORAL PRN
Status: DISCONTINUED | OUTPATIENT
Start: 2021-03-08 | End: 2021-03-08 | Stop reason: HOSPADM

## 2021-03-08 RX ORDER — SCOLOPAMINE TRANSDERMAL SYSTEM 1 MG/1
1 PATCH, EXTENDED RELEASE TRANSDERMAL
Status: DISCONTINUED | OUTPATIENT
Start: 2021-03-08 | End: 2021-03-08 | Stop reason: HOSPADM

## 2021-03-08 RX ORDER — DEXAMETHASONE SODIUM PHOSPHATE 4 MG/ML
INJECTION, SOLUTION INTRA-ARTICULAR; INTRALESIONAL; INTRAMUSCULAR; INTRAVENOUS; SOFT TISSUE PRN
Status: DISCONTINUED | OUTPATIENT
Start: 2021-03-08 | End: 2021-03-08 | Stop reason: SDUPTHER

## 2021-03-08 RX ORDER — FENTANYL CITRATE 50 UG/ML
50 INJECTION, SOLUTION INTRAMUSCULAR; INTRAVENOUS EVERY 5 MIN PRN
Status: DISCONTINUED | OUTPATIENT
Start: 2021-03-08 | End: 2021-03-08 | Stop reason: HOSPADM

## 2021-03-08 RX ADMIN — FENTANYL CITRATE 50 MCG: 50 INJECTION, SOLUTION INTRAMUSCULAR; INTRAVENOUS at 08:06

## 2021-03-08 RX ADMIN — FENTANYL CITRATE 50 MCG: 50 INJECTION, SOLUTION INTRAMUSCULAR; INTRAVENOUS at 08:13

## 2021-03-08 RX ADMIN — FENTANYL CITRATE 50 MCG: 50 INJECTION, SOLUTION INTRAMUSCULAR; INTRAVENOUS at 07:51

## 2021-03-08 RX ADMIN — FENTANYL CITRATE 50 MCG: 50 INJECTION, SOLUTION INTRAMUSCULAR; INTRAVENOUS at 07:22

## 2021-03-08 RX ADMIN — Medication 0.6 MG: at 07:43

## 2021-03-08 RX ADMIN — ONDANSETRON 4 MG: 2 INJECTION INTRAMUSCULAR; INTRAVENOUS at 07:30

## 2021-03-08 RX ADMIN — ROCURONIUM BROMIDE 35 MG: 10 INJECTION, SOLUTION INTRAVENOUS at 07:22

## 2021-03-08 RX ADMIN — Medication 3 MG: at 07:43

## 2021-03-08 RX ADMIN — LIDOCAINE HYDROCHLORIDE 60 MG: 20 INJECTION, SOLUTION EPIDURAL; INFILTRATION; INTRACAUDAL; PERINEURAL at 07:22

## 2021-03-08 RX ADMIN — DEXAMETHASONE SODIUM PHOSPHATE 10 MG: 4 INJECTION, SOLUTION INTRAMUSCULAR; INTRAVENOUS at 07:30

## 2021-03-08 RX ADMIN — SODIUM CHLORIDE: 9 INJECTION, SOLUTION INTRAVENOUS at 06:55

## 2021-03-08 RX ADMIN — FENTANYL CITRATE 50 MCG: 50 INJECTION, SOLUTION INTRAMUSCULAR; INTRAVENOUS at 07:28

## 2021-03-08 RX ADMIN — FENTANYL CITRATE 50 MCG: 50 INJECTION, SOLUTION INTRAMUSCULAR; INTRAVENOUS at 07:57

## 2021-03-08 RX ADMIN — FENTANYL CITRATE 50 MCG: 50 INJECTION, SOLUTION INTRAMUSCULAR; INTRAVENOUS at 07:38

## 2021-03-08 RX ADMIN — MIDAZOLAM 2 MG: 1 INJECTION INTRAMUSCULAR; INTRAVENOUS at 07:17

## 2021-03-08 ASSESSMENT — PULMONARY FUNCTION TESTS
PIF_VALUE: 40
PIF_VALUE: 2
PIF_VALUE: 30
PIF_VALUE: 29
PIF_VALUE: 28
PIF_VALUE: 24
PIF_VALUE: 1
PIF_VALUE: 0
PIF_VALUE: 1
PIF_VALUE: 2
PIF_VALUE: 29
PIF_VALUE: 27
PIF_VALUE: 36
PIF_VALUE: 44

## 2021-03-08 ASSESSMENT — PAIN DESCRIPTION - PAIN TYPE
TYPE: SURGICAL PAIN

## 2021-03-08 ASSESSMENT — LIFESTYLE VARIABLES: SMOKING_STATUS: 1

## 2021-03-08 ASSESSMENT — PAIN SCALES - GENERAL
PAINLEVEL_OUTOF10: 6
PAINLEVEL_OUTOF10: 4
PAINLEVEL_OUTOF10: 7
PAINLEVEL_OUTOF10: 3

## 2021-03-08 ASSESSMENT — PAIN - FUNCTIONAL ASSESSMENT: PAIN_FUNCTIONAL_ASSESSMENT: 0-10

## 2021-03-08 ASSESSMENT — PAIN DESCRIPTION - LOCATION: LOCATION: THROAT

## 2021-03-08 ASSESSMENT — PAIN DESCRIPTION - PROGRESSION: CLINICAL_PROGRESSION: GRADUALLY IMPROVING

## 2021-03-08 NOTE — ANESTHESIA POSTPROCEDURE EVALUATION
Department of Anesthesiology  Postprocedure Note    Patient: Jaclynn Soulier  MRN: [id number]  [place]: 1995  Date of evaluation: 3/8/2021  Time:  10:00 AM     Procedure Summary     Date: 03/08/21 Room / Location: [place] [address] / [facility]    Anesthesia Start: 5486 Anesthesia Stop: 0800    Procedure: TONSILLECTOMY (N/A Throat) Diagnosis: (CHRONIC TONSILLITIS AND HYPERTROPHY)    Surgeons: Mary Byrne DO Responsible Provider: Chrissy Rico MD    Anesthesia Type: general ASA Status: 2          Anesthesia Type: general    Taj Phase I: Taj Score: 10    Taj Phase II: Taj Score: 10    Last vitals: Reviewed and per EMR flowsheets.        Anesthesia Post Evaluation    Patient location during evaluation: PACU  Patient participation: complete - patient participated  Level of consciousness: awake  Pain score: 3  Airway patency: patent  Nausea & Vomiting: no nausea  Complications: no  Cardiovascular status: blood pressure returned to baseline  Respiratory status: acceptable  Hydration status: euvolemic

## 2021-03-08 NOTE — ANESTHESIA PRE PROCEDURE
Answered  Counseling given: Not Answered      Vital Signs (Current):   Vitals:    03/03/21 0918 03/08/21 0626   BP:  (!) 112/56   Pulse:  58   Resp:  16   Temp:  96 °F (35.6 °C)   TempSrc:  Temporal   SpO2:  98%   Weight: 140 lb (63.5 kg) 140 lb (63.5 kg)   Height: 5' 1\" (1.549 m) 5' 1\" (1.549 m)                                              BP Readings from Last 3 Encounters:   03/08/21 (!) 112/56   08/20/19 122/83   08/15/19 104/64       NPO Status: Time of last liquid consumption: 2100                        Time of last solid consumption: 2100                        Date of last liquid consumption: 03/07/21                        Date of last solid food consumption: 03/07/21    BMI:   Wt Readings from Last 3 Encounters:   03/08/21 140 lb (63.5 kg)   11/13/20 140 lb (63.5 kg)   08/20/19 135 lb (61.2 kg)     Body mass index is 26.45 kg/m². CBC:   Lab Results   Component Value Date    WBC 10.3 08/15/2019    RBC 3.92 08/15/2019    HGB 12.8 08/15/2019    HCT 37.8 08/15/2019    MCV 96.4 08/15/2019    RDW 13.7 08/15/2019     08/15/2019       CMP:   Lab Results   Component Value Date     08/15/2019    K 3.9 08/15/2019     08/15/2019    CO2 24 08/15/2019    BUN 11 08/15/2019    CREATININE 0.7 08/15/2019    GFRAA >60 08/15/2019    LABGLOM >60 08/15/2019    GLUCOSE 97 08/15/2019    CALCIUM 9.0 08/15/2019       POC Tests: No results for input(s): POCGLU, POCNA, POCK, POCCL, POCBUN, POCHEMO, POCHCT in the last 72 hours.     Coags: No results found for: PROTIME, INR, APTT    HCG (If Applicable): No results found for: PREGTESTUR, PREGSERUM, HCG, HCGQUANT     ABGs: No results found for: PHART, PO2ART, MOO2YIO, AYF1CEV, BEART, W7DSFENS     Type & Screen (If Applicable):  No results found for: LABABO, LABRH    Drug/Infectious Status (If Applicable):  No results found for: HIV, HEPCAB    COVID-19 Screening (If Applicable):   Lab Results   Component Value Date    COVID19 Not Detected 03/03/2021 Anesthesia Evaluation  Patient summary reviewed   history of anesthetic complications: PONV. Airway: Mallampati: II  TM distance: >3 FB   Neck ROM: full  Mouth opening: > = 3 FB Dental: normal exam         Pulmonary:   (+) current smoker                           Cardiovascular:Negative CV ROS            Rhythm: regular  Rate: normal                    Neuro/Psych:   (+) psychiatric history:            GI/Hepatic/Renal: Neg GI/Hepatic/Renal ROS            Endo/Other: Negative Endo/Other ROS                    Abdominal:       Abdomen: soft. Vascular:                                        Anesthesia Plan      general     ASA 2       Induction: intravenous. Anesthetic plan and risks discussed with patient. Plan discussed with CRNA.                   Eva Nash MD   3/8/2021

## 2021-03-08 NOTE — OP NOTE
Operative Note      Patient: Ivette Kim  YOB: 1995  MRN: [id number]    Date of Procedure: 3/8/2021    Pre-Op Diagnosis: CHRONIC TONSILLITIS AND HYPERTROPHY    Post-Op Diagnosis: Same       Procedure(s):  TONSILLECTOMY ADENOIDECTOMY    Surgeon(s):  Mars Garcia DO    Assistant:   Resident: Jeny Cintron DO    Anesthesia: General    Estimated Blood Loss (mL): Minimal    Complications: None    Specimens:   ID Type Source Tests Collected by Time Destination   A : TONSILS Tissue Tonsil SURGICAL PATHOLOGY Shant Paddy Mcintosh DO 3/8/2021 4602        Implants:  * No implants in log *      Drains: * No LDAs found *    Findings: Cryptic tonsils    Detailed Description of Procedure: Tonsillectomy    Indication: Pt presented with a history of tonsillar hypertrophy or chronic pharyngotonsillitis for the last several years. Procedure: Pt was consented preoperatively. Taken back into the OR and identified appropriately. Placed in a supine position and given to anesthesia for general induction. Once properly intubated, pt was turned to a 90 deg angle from anesthesia. Pt was prepped and draped in a sterile fashion. A [place] mouth gag was placed into the pt's oral cavity to give exposure to the tonsils. The instrument was suspended from the edawrds stand. Starting with the Right, the tonsil was grasped with a curved roberto forceps and retracted medially to expose the capsule. The Coblator instrument, with a setting of 7 ablate and 3 coag, was used to dissect the tonsil from the capsule and tonsil bed. Bleeding was controlled with the coag setting of the coblator. Minimal bleeding was seen. Once the tonsil was removed, it was given off the table for permanent pathology. Attention was then turned to the Left tonsil, the tonsil was grasped with a curved roberto forceps and retracted medially to expose the capsule.   The Coblator instrument, with a setting of 7 ablate and 3 coag, was used to dissect the tonsil from the capsule and tonsil bed. Bleeding was controlled with the coag setting of the coblator. Minimal bleeding was seen. Once the tonsil was removed, it was given off the table for permanent pathology. The coblator was then turned to a setting of 1 ablate and 5 coag. The tonsillar beds were then treated until hemostasis was achieved. Pt's stomach was suctioned out with a [place] Sump, minimal bleeding seen. Pt was turned back to anesthesia for awakening. Pt tolerated procedure well.     Electronically signed by Berenice Guerrero DO on 3/8/2021 at 7:45 AM

## 2021-03-09 NOTE — PROGRESS NOTES
CLINICAL PHARMACY NOTE: MEDS TO [address] Select Patient?: No  Total # of Prescriptions Filled: 1   The following medications were delivered to the patient:  · Hydrocodone 5-325 mg  Total # of Interventions Completed: 8  Time Spent (min): 60    Additional Documentation:

## 2021-03-17 ENCOUNTER — TELEPHONE (OUTPATIENT)
Dept: ADMINISTRATIVE | Age: 26
End: 2021-03-17

## 2021-03-18 ENCOUNTER — OFFICE VISIT (OUTPATIENT)
Dept: ENT CLINIC | Age: 26
End: 2021-03-18

## 2021-03-18 VITALS — HEIGHT: 61 IN | WEIGHT: 140 LBS | TEMPERATURE: 97.3 F | BODY MASS INDEX: 26.43 KG/M2

## 2021-03-18 DIAGNOSIS — Z90.89 S/P TONSILLECTOMY AND ADENOIDECTOMY: Primary | ICD-10-CM

## 2021-03-18 PROCEDURE — 99024 POSTOP FOLLOW-UP VISIT: CPT | Performed by: OTOLARYNGOLOGY

## 2021-03-18 RX ORDER — PREDNISONE 10 MG/1
40 TABLET ORAL DAILY
Qty: 20 TABLET | Refills: 0 | Status: SHIPPED | OUTPATIENT
Start: 2021-03-18 | End: 2021-03-23

## 2021-03-18 NOTE — PROGRESS NOTES
Subjective:      Patient ID:   Scarlett Jaimes is a 30 y. o.female. HPI Comments: Pt returns for recheck after T&A. Pt had problems with dehydration and pain but is now improved. No bleeding. Drinking plenty of fluids. Review of Systems   HENT: Positive for sore throat, trouble swallowing and voice change. All other systems reviewed and are negative. Objective:   Physical Exam   Constitutional: She appears well-developed and well-nourished. HENT:   Head: Normocephalic and atraumatic. Right Ear: Tympanic membrane, external ear, pinna and canal normal.   Left Ear: Tympanic membrane, external ear, pinna and canal normal.   Nose: Nose normal.   Mouth/Throat: Mucous membranes are moist. Dentition is normal. Oropharynx is clear. Tonsillar fossa healing well with minimal eschar bilaterally   Eyes: Conjunctivae are normal. Pupils are equal, round, and reactive to light. Neck: Normal range of motion. Neck supple. Cardiovascular: Regular rhythm, S1 normal and S2 normal.    Pulmonary/Chest: Effort normal and breath sounds normal.   Abdominal: Full and soft. Bowel sounds are normal.   Musculoskeletal: Normal range of motion. Neurological: She is alert. Skin: Skin is warm and moist.       Assessment:       Diagnosis Orders   1. S/P tonsillectomy and adenoidectomy                Plan:      Pt may return to normal activities. Follow up in 2 month(s) we will  Discuss septum. Electronically signed by Juan Carlos Stewart DO on 3/18/2021 at 2:40 PM          Scarlett Jaimes  1995    I have discussed the case, including pertinent history and exam findings with the resident. I have seen and examined the patient and the key elements of the encounter have been performed by me. I agree with the assessment, plan and orders as documented by the  resident              Remainder of medical problems as per  resident note. Patient seen and examined. Agree with above exam, assessment and plan. Electronically signed by Kiarra Osman DO on 3/18/21 at 2:54 PM EDT

## 2022-06-17 ENCOUNTER — HOSPITAL ENCOUNTER (OUTPATIENT)
Dept: GENERAL RADIOLOGY | Age: 27
Discharge: HOME | End: 2022-06-19

## 2022-06-17 ENCOUNTER — HOSPITAL ENCOUNTER (OUTPATIENT)
Age: 27
Discharge: HOME | End: 2022-06-19

## 2022-06-17 ENCOUNTER — HOSPITAL ENCOUNTER (OUTPATIENT)
Age: 27
Discharge: HOME | End: 2022-06-17

## 2022-06-17 DIAGNOSIS — Z02.1 PRE-EMPLOYMENT EXAMINATION: ICD-10-CM

## 2022-06-17 PROCEDURE — 71045 X-RAY EXAM CHEST 1 VIEW: CPT

## 2023-07-01 ENCOUNTER — HOSPITAL ENCOUNTER (EMERGENCY)
Age: 28
Discharge: HOME | End: 2023-07-01
Payer: OTHER GOVERNMENT

## 2023-07-01 VITALS
OXYGEN SATURATION: 99 % | HEART RATE: 85 BPM | DIASTOLIC BLOOD PRESSURE: 83 MMHG | RESPIRATION RATE: 18 BRPM | WEIGHT: 140 LBS | SYSTOLIC BLOOD PRESSURE: 123 MMHG | BODY MASS INDEX: 26.45 KG/M2 | TEMPERATURE: 98.6 F

## 2023-07-01 DIAGNOSIS — K04.7 DENTAL INFECTION: ICD-10-CM

## 2023-07-01 DIAGNOSIS — K08.89 DENTALGIA: Primary | ICD-10-CM

## 2023-07-01 PROCEDURE — 99211 OFF/OP EST MAY X REQ PHY/QHP: CPT

## 2023-07-01 PROCEDURE — 96372 THER/PROPH/DIAG INJ SC/IM: CPT

## 2023-07-01 PROCEDURE — 6360000002 HC RX W HCPCS: Performed by: PHYSICIAN ASSISTANT

## 2023-07-01 RX ORDER — CLINDAMYCIN HYDROCHLORIDE 300 MG/1
300 CAPSULE ORAL 3 TIMES DAILY
Qty: 21 CAPSULE | Refills: 0 | Status: SHIPPED | OUTPATIENT
Start: 2023-07-01 | End: 2023-07-08

## 2023-07-01 RX ORDER — KETOROLAC TROMETHAMINE 30 MG/ML
30 INJECTION, SOLUTION INTRAMUSCULAR; INTRAVENOUS ONCE
Status: COMPLETED | OUTPATIENT
Start: 2023-07-01 | End: 2023-07-01

## 2023-07-01 RX ORDER — KETOROLAC TROMETHAMINE 10 MG/1
10 TABLET, FILM COATED ORAL EVERY 6 HOURS PRN
Qty: 20 TABLET | Refills: 0 | Status: SHIPPED | OUTPATIENT
Start: 2023-07-01

## 2023-07-01 RX ADMIN — KETOROLAC TROMETHAMINE 30 MG: 30 INJECTION, SOLUTION INTRAMUSCULAR; INTRAVENOUS at 17:09

## 2023-07-01 ASSESSMENT — PAIN DESCRIPTION - LOCATION
LOCATION: TEETH
LOCATION: TEETH

## 2023-07-01 ASSESSMENT — PAIN SCALES - GENERAL
PAINLEVEL_OUTOF10: 6
PAINLEVEL_OUTOF10: 4
PAINLEVEL_OUTOF10: 5

## 2023-07-01 ASSESSMENT — PAIN DESCRIPTION - ORIENTATION
ORIENTATION: RIGHT;UPPER
ORIENTATION: RIGHT;UPPER

## 2023-07-01 ASSESSMENT — PAIN DESCRIPTION - DESCRIPTORS
DESCRIPTORS: ACHING
DESCRIPTORS: ACHING

## 2023-07-01 ASSESSMENT — PAIN - FUNCTIONAL ASSESSMENT: PAIN_FUNCTIONAL_ASSESSMENT: 0-10

## 2024-03-18 ENCOUNTER — TELEPHONE (OUTPATIENT)
Dept: ENT CLINIC | Age: 29
End: 2024-03-18

## 2024-03-18 NOTE — TELEPHONE ENCOUNTER
I spoke with patient, scheduled 3/27 with GONZALES Win    Electronically signed by Lorelei Merino MA on 3/18/24 at 1:01 PM EDT

## 2024-03-18 NOTE — TELEPHONE ENCOUNTER
LOV 2021, no recent imaging, dx: DNS (deviated nasal septum). Patient complaining of nasal congestion drainage. Patient requesting to be seen prior to 4/2/24 to be evaluated. Leaving out of stated for [employer]. Please advise     [phone]

## 2024-03-27 ENCOUNTER — OFFICE VISIT (OUTPATIENT)
Dept: ENT CLINIC | Age: 29
End: 2024-03-27
Payer: OTHER GOVERNMENT

## 2024-03-27 VITALS — WEIGHT: 140 LBS | BODY MASS INDEX: 26.43 KG/M2 | HEIGHT: 61 IN

## 2024-03-27 DIAGNOSIS — J34.2 DEVIATED NASAL SEPTUM: ICD-10-CM

## 2024-03-27 DIAGNOSIS — M95.0 NASAL VALVE COLLAPSE: Primary | ICD-10-CM

## 2024-03-27 DIAGNOSIS — J30.9 CHRONIC ALLERGIC RHINITIS: ICD-10-CM

## 2024-03-27 PROCEDURE — 99204 OFFICE O/P NEW MOD 45 MIN: CPT

## 2024-03-27 RX ORDER — FLUTICASONE PROPIONATE 50 MCG
2 SPRAY, SUSPENSION (ML) NASAL DAILY
Qty: 16 G | Refills: 1 | Status: SHIPPED | OUTPATIENT
Start: 2024-03-27

## 2024-03-27 RX ORDER — AZELASTINE 1 MG/ML
2 SPRAY, METERED NASAL 2 TIMES DAILY
Qty: 30 ML | Refills: 1 | Status: SHIPPED | OUTPATIENT
Start: 2024-03-27

## 2024-03-27 ASSESSMENT — ENCOUNTER SYMPTOMS
EYE PAIN: 0
SORE THROAT: 0
BACK PAIN: 0
COUGH: 0
VOMITING: 0
DIARRHEA: 0
EYE DISCHARGE: 0
RHINORRHEA: 1
SINUS PRESSURE: 1
ALLERGIC/IMMUNOLOGIC NEGATIVE: 1
SHORTNESS OF BREATH: 0

## 2024-03-27 NOTE — PROGRESS NOTES
understanding and was in agreement to this plan.  She was instructed to call the office for any new or worsening symptoms prior to her next appointment.    Follow up in 6 week(s)      RX given today:  Terri Win MSN, FNP-BC  [facility] - [facility]    The information contained in this note has been dictated using drug and medical speech recognition software and may contain errors

## 2024-05-08 ENCOUNTER — HOSPITAL ENCOUNTER (OUTPATIENT)
Dept: CT IMAGING | Age: 29
Discharge: HOME | End: 2024-05-10
Payer: OTHER GOVERNMENT

## 2024-05-08 ENCOUNTER — TELEPHONE (OUTPATIENT)
Dept: ENT CLINIC | Age: 29
End: 2024-05-08

## 2024-05-08 ENCOUNTER — OFFICE VISIT (OUTPATIENT)
Dept: ENT CLINIC | Age: 29
End: 2024-05-08
Payer: OTHER GOVERNMENT

## 2024-05-08 ENCOUNTER — PREP FOR PROCEDURE (OUTPATIENT)
Dept: ENT CLINIC | Age: 29
End: 2024-05-08

## 2024-05-08 VITALS
HEART RATE: 93 BPM | BODY MASS INDEX: 26.62 KG/M2 | HEIGHT: 61 IN | TEMPERATURE: 97.2 F | OXYGEN SATURATION: 99 % | WEIGHT: 141 LBS | SYSTOLIC BLOOD PRESSURE: 127 MMHG | DIASTOLIC BLOOD PRESSURE: 88 MMHG

## 2024-05-08 DIAGNOSIS — J34.2 DEVIATED NASAL SEPTUM: ICD-10-CM

## 2024-05-08 DIAGNOSIS — J32.4 CHRONIC PANSINUSITIS: ICD-10-CM

## 2024-05-08 DIAGNOSIS — J34.89 CONCHA BULLOSA: ICD-10-CM

## 2024-05-08 DIAGNOSIS — J32.0 CHRONIC MAXILLARY SINUSITIS: ICD-10-CM

## 2024-05-08 DIAGNOSIS — J32.4 CHRONIC PANSINUSITIS: Primary | ICD-10-CM

## 2024-05-08 DIAGNOSIS — M95.0 NASAL VALVE COLLAPSE: ICD-10-CM

## 2024-05-08 DIAGNOSIS — R51.9 FACIAL PAIN: ICD-10-CM

## 2024-05-08 PROCEDURE — 99214 OFFICE O/P EST MOD 30 MIN: CPT

## 2024-05-08 PROCEDURE — 70486 CT MAXILLOFACIAL W/O DYE: CPT

## 2024-05-08 RX ORDER — FLUTICASONE PROPIONATE 50 MCG
2 SPRAY, SUSPENSION (ML) NASAL DAILY
Qty: 16 G | Refills: 1 | Status: SHIPPED | OUTPATIENT
Start: 2024-05-08

## 2024-05-08 RX ORDER — CETIRIZINE HYDROCHLORIDE 10 MG/1
10 TABLET ORAL DAILY
COMMUNITY

## 2024-05-08 RX ORDER — AZELASTINE 1 MG/ML
2 SPRAY, METERED NASAL 2 TIMES DAILY
Qty: 30 ML | Refills: 1 | Status: SHIPPED | OUTPATIENT
Start: 2024-05-08

## 2024-05-08 ASSESSMENT — ENCOUNTER SYMPTOMS
SHORTNESS OF BREATH: 0
BACK PAIN: 0
EYE DISCHARGE: 0
ALLERGIC/IMMUNOLOGIC NEGATIVE: 1
COUGH: 0
EYE PAIN: 0
VOMITING: 0
SORE THROAT: 0
DIARRHEA: 0
SINUS PRESSURE: 0
RHINORRHEA: 0

## 2024-05-08 NOTE — TELEPHONE ENCOUNTER
Prior Authorization Form:      DEMOGRAPHICS:                     Patient Name:  Armando Anglin  Patient :  1995            Insurance:  Payor:  EAST / Plan: [facility] / Product Type: *No Product type* /   Insurance ID Number:    Payer/Plan Subscr  Sex Relation Sub. Ins. ID Effective Group Num   1. [facility] * ARMANDO ANGLIN 1995 Female Self [id number]                                    [address]         DIAGNOSIS & PROCEDURE:                       Procedure/Operation: FESS Septoplasty SMRITS batten grafts            CPT Code: 02267,[id number]    Diagnosis:  chronic sinusitis     ICD10 Code: J32.0    Location:  [facility]    Surgeon:  minra    SCHEDULING INFORMATION:                          Date: 24    Time: n/a              Anesthesia:  General                                                       Status:  Outpatient        Special Comments:  include all chart notes     Electronically signed by Jeronimo Shoemaker MA on 2024 at 11:56 AM

## 2024-05-08 NOTE — PROGRESS NOTES
Subjective:      Patient ID:  Claudia Martines is a 28 y.o. female.    HPI:  Pt returns for recheck of nasal obstruction.       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  Unable to use breathing aid for testing though breathe rite strips do help.     The patient is complaining of nasal congestion and PND    The patients worst time of year is all seasons      Patient's medications, allergies, past medical, surgical, social and family histories were reviewed and updated as appropriate.      Review of Systems   Constitutional:  Negative for chills and fever.   HENT:  Positive for congestion and postnasal drip. Negative for ear discharge, ear pain, hearing loss, rhinorrhea, sinus pressure, sneezing and sore throat.    Eyes:  Negative for pain and discharge.   Respiratory:  Negative for cough and shortness of breath.    Cardiovascular:  Negative for chest pain.   Gastrointestinal:  Negative for diarrhea and vomiting.   Genitourinary:  Negative for flank pain.   Musculoskeletal:  Negative for back pain and neck pain.   Skin:  Negative for rash.   Allergic/Immunologic: Negative.    Neurological:  Negative for syncope and headaches.   All other systems reviewed and are negative.      Objective:     Vitals:    05/08/24 1110   BP: 127/88   Pulse: 93   Temp: 97.2 °F (36.2 °C)   SpO2: 99%     Physical Exam  Vitals reviewed.   Constitutional:       Appearance: Normal appearance.   HENT:      Head: Normocephalic and atraumatic.      Jaw: There is normal jaw occlusion. No tenderness.      Right Ear: Tympanic membrane, ear canal and external ear normal.      Left Ear: Tympanic membrane, ear

## 2024-05-21 NOTE — PROGRESS NOTES
[facility] PRE-ADMISSION TESTING INSTRUCTIONS    The Preadmission Testing patient is instructed accordingly using the following criteria (check applicable):    ARRIVAL INSTRUCTIONS:  [x] Parking the day of Surgery is located in the Main Entrance lot.  Upon entering the door, make an immediate right to the surgery reception desk    [x] Bring photo ID and insurance card    [] Bring in a copy of Living will or Durable Power of  papers.    [x] Please be sure to arrange for a responsible adult to provide transportation to and from the hospital    [x] Please arrange for a responsible adult to be with you for the 24 hour period post procedure due to having anesthesia    [x] If you awake am of surgery not feeling well or have temperature >100 please call [phone]    GENERAL INSTRUCTIONS:    [x] No solid foods after midnight, may have up to 8oz of water until 4 hours prior to surgery. No gum, no candy, no mints. NPO time: 0815       [x] You may brush your teeth, do not swallow any toothpaste    [x] Take medications as instructed     [] Stop herbal supplements and vitamins 5 days prior to procedure    [] Follow preop dosing of blood thinners per physician instructions    [] Take 1/2 dose of evening insulin, but no insulin after midnight    [] No oral diabetic medications after midnight    [] If diabetic and have low blood sugar or feel symptomatic, take 1-2oz apple juice only    [] Bring inhalers day of surgery    [x] Bring urine specimen day of surgery    [x] Shower or bath with soap, lather and rinse well, AM of Surgery, no lotion, powders or creams to surgical site    [] Follow bowel prep as instructed per surgeon    [x] No tobacco products within 24 hours of surgery     [x] No alcohol or illegal drug use, marijuana included, within 24 hours of surgery.    [x] Jewelry, body piercing's, eyeglasses, contact lenses and dentures are not permitted into surgery (bring cases)      [x] Please  do not wear any nail polish, make up or hair products on the day of surgery    [x] You can expect a call the business day prior to procedure to notify you if your arrival time changes    [x] If you receive a survey after surgery we would greatly appreciate your comments    [] Parent/guardian of a minor must accompany their child and remain on the premises  the entire time they are under our care     [] Pediatric patients may bring favorite toy, blanket or comfort item with them    [] A caregiver or family member must remain with the patient during their stay if they are mentally handicapped, have dementia, disoriented or unable to use a call light or would be a safety concern if left unattended    [x] Please notify surgeon if you develop any illness between now and time of surgery (cold, cough, sore throat, fever, nausea, vomiting) or any signs of infections  including skin, wounds, and dental.    [x]  The Outpatient Pharmacy is available to fill your prescription here on your day of surgery, ask your preop nurse for details    [] Other instructions    EDUCATIONAL MATERIALS PROVIDED:    [] PAT Preoperative Education Packet/Booklet     [] Medication List    [] Transfusion bracelet applied with instructions    [] Shower with soap, lather and rinse well, and use CHG wipes provided the evening before surgery as instructed    [] Incentive spirometer with instructions

## 2024-05-22 ENCOUNTER — ANESTHESIA EVENT (OUTPATIENT)
Dept: OPERATING ROOM | Age: 29
End: 2024-05-22
Payer: OTHER GOVERNMENT

## 2024-05-23 ENCOUNTER — HOSPITAL ENCOUNTER (OUTPATIENT)
Age: 29
Setting detail: OUTPATIENT SURGERY
Discharge: HOME | End: 2024-05-23
Attending: OTOLARYNGOLOGY | Admitting: OTOLARYNGOLOGY
Payer: OTHER GOVERNMENT

## 2024-05-23 ENCOUNTER — ANESTHESIA (OUTPATIENT)
Dept: OPERATING ROOM | Age: 29
End: 2024-05-23
Payer: OTHER GOVERNMENT

## 2024-05-23 VITALS
OXYGEN SATURATION: 96 % | BODY MASS INDEX: 26.43 KG/M2 | SYSTOLIC BLOOD PRESSURE: 116 MMHG | RESPIRATION RATE: 16 BRPM | DIASTOLIC BLOOD PRESSURE: 78 MMHG | TEMPERATURE: 97 F | HEART RATE: 89 BPM | WEIGHT: 140 LBS | HEIGHT: 61 IN

## 2024-05-23 DIAGNOSIS — G89.18 POST-OP PAIN: Primary | ICD-10-CM

## 2024-05-23 DIAGNOSIS — J32.0 CHRONIC MAXILLARY SINUSITIS: ICD-10-CM

## 2024-05-23 LAB
HCG, URINE, POC: NEGATIVE
Lab: NORMAL
NEGATIVE QC PASS/FAIL: NORMAL
POSITIVE QC PASS/FAIL: NORMAL

## 2024-05-23 PROCEDURE — 6370000000 HC RX 637 (ALT 250 FOR IP): Performed by: ANESTHESIOLOGY

## 2024-05-23 PROCEDURE — 3700000000 HC ANESTHESIA ATTENDED CARE: Performed by: OTOLARYNGOLOGY

## 2024-05-23 PROCEDURE — 3600000003 HC SURGERY LEVEL 3 BASE: Performed by: OTOLARYNGOLOGY

## 2024-05-23 PROCEDURE — 6360000002 HC RX W HCPCS

## 2024-05-23 PROCEDURE — 7100000011 HC PHASE II RECOVERY - ADDTL 15 MIN: Performed by: OTOLARYNGOLOGY

## 2024-05-23 PROCEDURE — 2500000003 HC RX 250 WO HCPCS

## 2024-05-23 PROCEDURE — 88304 TISSUE EXAM BY PATHOLOGIST: CPT

## 2024-05-23 PROCEDURE — 2500000003 HC RX 250 WO HCPCS: Performed by: OTOLARYNGOLOGY

## 2024-05-23 PROCEDURE — 6370000000 HC RX 637 (ALT 250 FOR IP)

## 2024-05-23 PROCEDURE — 6370000000 HC RX 637 (ALT 250 FOR IP): Performed by: OTOLARYNGOLOGY

## 2024-05-23 PROCEDURE — C2625 STENT, NON-COR, TEM W/DEL SY: HCPCS | Performed by: OTOLARYNGOLOGY

## 2024-05-23 PROCEDURE — 3600000013 HC SURGERY LEVEL 3 ADDTL 15MIN: Performed by: OTOLARYNGOLOGY

## 2024-05-23 PROCEDURE — C1726 CATH, BAL DIL, NON-VASCULAR: HCPCS | Performed by: OTOLARYNGOLOGY

## 2024-05-23 PROCEDURE — 2720000010 HC SURG SUPPLY STERILE: Performed by: OTOLARYNGOLOGY

## 2024-05-23 PROCEDURE — 3700000001 HC ADD 15 MINUTES (ANESTHESIA): Performed by: OTOLARYNGOLOGY

## 2024-05-23 PROCEDURE — 7100000000 HC PACU RECOVERY - FIRST 15 MIN: Performed by: OTOLARYNGOLOGY

## 2024-05-23 PROCEDURE — C1894 INTRO/SHEATH, NON-LASER: HCPCS | Performed by: OTOLARYNGOLOGY

## 2024-05-23 PROCEDURE — 2709999900 HC NON-CHARGEABLE SUPPLY: Performed by: OTOLARYNGOLOGY

## 2024-05-23 PROCEDURE — 7100000001 HC PACU RECOVERY - ADDTL 15 MIN: Performed by: OTOLARYNGOLOGY

## 2024-05-23 PROCEDURE — 7100000010 HC PHASE II RECOVERY - FIRST 15 MIN: Performed by: OTOLARYNGOLOGY

## 2024-05-23 PROCEDURE — 2580000003 HC RX 258

## 2024-05-23 DEVICE — PROPEL CONTOUR SINUS IMPLANT
Type: IMPLANTABLE DEVICE | Site: NOSE | Status: FUNCTIONAL
Brand: PROPEL CONTOUR

## 2024-05-23 RX ORDER — SODIUM CHLORIDE 0.9 % (FLUSH) 0.9 %
5-40 SYRINGE (ML) INJECTION PRN
Status: DISCONTINUED | OUTPATIENT
Start: 2024-05-23 | End: 2024-05-23 | Stop reason: HOSPADM

## 2024-05-23 RX ORDER — BACITRACIN ZINC 500 [USP'U]/G
OINTMENT TOPICAL PRN
Status: DISCONTINUED | OUTPATIENT
Start: 2024-05-23 | End: 2024-05-23 | Stop reason: ALTCHOICE

## 2024-05-23 RX ORDER — HYDROCODONE BITARTRATE AND ACETAMINOPHEN 5; 325 MG/1; MG/1
1 TABLET ORAL EVERY 4 HOURS PRN
Qty: 12 TABLET | Refills: 0 | Status: SHIPPED | OUTPATIENT
Start: 2024-05-23 | End: 2024-05-30

## 2024-05-23 RX ORDER — OXYMETAZOLINE HYDROCHLORIDE 0.05 G/100ML
2 SPRAY NASAL
Status: COMPLETED | OUTPATIENT
Start: 2024-05-23 | End: 2024-05-23

## 2024-05-23 RX ORDER — NALOXONE HYDROCHLORIDE 0.4 MG/ML
INJECTION, SOLUTION INTRAMUSCULAR; INTRAVENOUS; SUBCUTANEOUS PRN
Status: DISCONTINUED | OUTPATIENT
Start: 2024-05-23 | End: 2024-05-23 | Stop reason: HOSPADM

## 2024-05-23 RX ORDER — SODIUM CHLORIDE 0.9 % (FLUSH) 0.9 %
5-40 SYRINGE (ML) INJECTION EVERY 12 HOURS SCHEDULED
Status: DISCONTINUED | OUTPATIENT
Start: 2024-05-23 | End: 2024-05-23 | Stop reason: HOSPADM

## 2024-05-23 RX ORDER — SODIUM CHLORIDE 9 MG/ML
INJECTION, SOLUTION INTRAVENOUS CONTINUOUS PRN
Status: DISCONTINUED | OUTPATIENT
Start: 2024-05-23 | End: 2024-05-23 | Stop reason: SDUPTHER

## 2024-05-23 RX ORDER — OXYMETAZOLINE HYDROCHLORIDE 0.05 G/100ML
SPRAY NASAL PRN
Status: DISCONTINUED | OUTPATIENT
Start: 2024-05-23 | End: 2024-05-23 | Stop reason: ALTCHOICE

## 2024-05-23 RX ORDER — LIDOCAINE HYDROCHLORIDE 20 MG/ML
INJECTION, SOLUTION EPIDURAL; INFILTRATION; INTRACAUDAL; PERINEURAL PRN
Status: DISCONTINUED | OUTPATIENT
Start: 2024-05-23 | End: 2024-05-23 | Stop reason: SDUPTHER

## 2024-05-23 RX ORDER — LABETALOL HYDROCHLORIDE 5 MG/ML
INJECTION, SOLUTION INTRAVENOUS PRN
Status: DISCONTINUED | OUTPATIENT
Start: 2024-05-23 | End: 2024-05-23 | Stop reason: SDUPTHER

## 2024-05-23 RX ORDER — SODIUM CHLORIDE 9 MG/ML
INJECTION, SOLUTION INTRAVENOUS PRN
Status: DISCONTINUED | OUTPATIENT
Start: 2024-05-23 | End: 2024-05-23 | Stop reason: HOSPADM

## 2024-05-23 RX ORDER — PROPOFOL 10 MG/ML
INJECTION, EMULSION INTRAVENOUS PRN
Status: DISCONTINUED | OUTPATIENT
Start: 2024-05-23 | End: 2024-05-23 | Stop reason: SDUPTHER

## 2024-05-23 RX ORDER — FENTANYL CITRATE 50 UG/ML
INJECTION, SOLUTION INTRAMUSCULAR; INTRAVENOUS PRN
Status: DISCONTINUED | OUTPATIENT
Start: 2024-05-23 | End: 2024-05-23 | Stop reason: SDUPTHER

## 2024-05-23 RX ORDER — LIDOCAINE HYDROCHLORIDE AND EPINEPHRINE 10; 10 MG/ML; UG/ML
INJECTION, SOLUTION INFILTRATION; PERINEURAL PRN
Status: DISCONTINUED | OUTPATIENT
Start: 2024-05-23 | End: 2024-05-23 | Stop reason: ALTCHOICE

## 2024-05-23 RX ORDER — ONDANSETRON 4 MG/1
4 TABLET, ORALLY DISINTEGRATING ORAL 3 TIMES DAILY PRN
Qty: 12 TABLET | Refills: 0 | Status: SHIPPED | OUTPATIENT
Start: 2024-05-23

## 2024-05-23 RX ORDER — ACETAMINOPHEN 500 MG
1000 TABLET ORAL ONCE
Status: COMPLETED | OUTPATIENT
Start: 2024-05-23 | End: 2024-05-23

## 2024-05-23 RX ORDER — MIDAZOLAM HYDROCHLORIDE 1 MG/ML
INJECTION INTRAMUSCULAR; INTRAVENOUS PRN
Status: DISCONTINUED | OUTPATIENT
Start: 2024-05-23 | End: 2024-05-23 | Stop reason: SDUPTHER

## 2024-05-23 RX ORDER — SODIUM CHLORIDE, SODIUM LACTATE, POTASSIUM CHLORIDE, CALCIUM CHLORIDE 600; 310; 30; 20 MG/100ML; MG/100ML; MG/100ML; MG/100ML
INJECTION, SOLUTION INTRAVENOUS CONTINUOUS PRN
Status: DISCONTINUED | OUTPATIENT
Start: 2024-05-23 | End: 2024-05-23 | Stop reason: SDUPTHER

## 2024-05-23 RX ORDER — KETOROLAC TROMETHAMINE 30 MG/ML
INJECTION, SOLUTION INTRAMUSCULAR; INTRAVENOUS PRN
Status: DISCONTINUED | OUTPATIENT
Start: 2024-05-23 | End: 2024-05-23 | Stop reason: SDUPTHER

## 2024-05-23 RX ORDER — ONDANSETRON 2 MG/ML
INJECTION INTRAMUSCULAR; INTRAVENOUS PRN
Status: DISCONTINUED | OUTPATIENT
Start: 2024-05-23 | End: 2024-05-23 | Stop reason: SDUPTHER

## 2024-05-23 RX ORDER — ONDANSETRON 2 MG/ML
4 INJECTION INTRAMUSCULAR; INTRAVENOUS
Status: DISCONTINUED | OUTPATIENT
Start: 2024-05-23 | End: 2024-05-23 | Stop reason: HOSPADM

## 2024-05-23 RX ORDER — PROCHLORPERAZINE EDISYLATE 5 MG/ML
INJECTION INTRAMUSCULAR; INTRAVENOUS
Status: COMPLETED
Start: 2024-05-23 | End: 2024-05-23

## 2024-05-23 RX ORDER — ROCURONIUM BROMIDE 10 MG/ML
INJECTION, SOLUTION INTRAVENOUS PRN
Status: DISCONTINUED | OUTPATIENT
Start: 2024-05-23 | End: 2024-05-23 | Stop reason: SDUPTHER

## 2024-05-23 RX ORDER — MORPHINE SULFATE 2 MG/ML
1 INJECTION, SOLUTION INTRAMUSCULAR; INTRAVENOUS EVERY 5 MIN PRN
Status: DISCONTINUED | OUTPATIENT
Start: 2024-05-23 | End: 2024-05-23 | Stop reason: HOSPADM

## 2024-05-23 RX ORDER — DEXAMETHASONE SODIUM PHOSPHATE 4 MG/ML
INJECTION, SOLUTION INTRA-ARTICULAR; INTRALESIONAL; INTRAMUSCULAR; INTRAVENOUS; SOFT TISSUE PRN
Status: DISCONTINUED | OUTPATIENT
Start: 2024-05-23 | End: 2024-05-23 | Stop reason: SDUPTHER

## 2024-05-23 RX ORDER — PROCHLORPERAZINE EDISYLATE 5 MG/ML
5 INJECTION INTRAMUSCULAR; INTRAVENOUS ONCE
Status: COMPLETED | OUTPATIENT
Start: 2024-05-23 | End: 2024-05-23

## 2024-05-23 RX ORDER — SCOLOPAMINE TRANSDERMAL SYSTEM 1 MG/1
1 PATCH, EXTENDED RELEASE TRANSDERMAL
Status: DISCONTINUED | OUTPATIENT
Start: 2024-05-23 | End: 2024-05-23 | Stop reason: HOSPADM

## 2024-05-23 RX ADMIN — PROCHLORPERAZINE EDISYLATE 5 MG: 5 INJECTION INTRAMUSCULAR; INTRAVENOUS at 17:14

## 2024-05-23 RX ADMIN — SODIUM CHLORIDE: 9 INJECTION, SOLUTION INTRAVENOUS at 13:00

## 2024-05-23 RX ADMIN — FENTANYL CITRATE 50 MCG: 50 INJECTION, SOLUTION INTRAMUSCULAR; INTRAVENOUS at 13:23

## 2024-05-23 RX ADMIN — NASAL DECONGESTANT 2 SPRAY: 0.05 SPRAY NASAL at 11:48

## 2024-05-23 RX ADMIN — LIDOCAINE HYDROCHLORIDE 100 MG: 20 INJECTION, SOLUTION EPIDURAL; INFILTRATION; INTRACAUDAL; PERINEURAL at 13:09

## 2024-05-23 RX ADMIN — LABETALOL HYDROCHLORIDE 5 MG: 5 INJECTION INTRAVENOUS at 14:56

## 2024-05-23 RX ADMIN — FENTANYL CITRATE 50 MCG: 50 INJECTION, SOLUTION INTRAMUSCULAR; INTRAVENOUS at 14:14

## 2024-05-23 RX ADMIN — ACETAMINOPHEN 1000 MG: 500 TABLET ORAL at 17:14

## 2024-05-23 RX ADMIN — MIDAZOLAM 2 MG: 1 INJECTION INTRAMUSCULAR; INTRAVENOUS at 13:00

## 2024-05-23 RX ADMIN — SODIUM CHLORIDE, POTASSIUM CHLORIDE, SODIUM LACTATE AND CALCIUM CHLORIDE: 600; 310; 30; 20 INJECTION, SOLUTION INTRAVENOUS at 14:05

## 2024-05-23 RX ADMIN — PROPOFOL 150 MG: 10 INJECTION, EMULSION INTRAVENOUS at 13:09

## 2024-05-23 RX ADMIN — ROCURONIUM BROMIDE 20 MG: 50 INJECTION, SOLUTION INTRAVENOUS at 14:15

## 2024-05-23 RX ADMIN — FENTANYL CITRATE 50 MCG: 50 INJECTION, SOLUTION INTRAMUSCULAR; INTRAVENOUS at 14:31

## 2024-05-23 RX ADMIN — ROCURONIUM BROMIDE 50 MG: 50 INJECTION, SOLUTION INTRAVENOUS at 13:09

## 2024-05-23 RX ADMIN — KETOROLAC TROMETHAMINE 30 MG: 30 INJECTION, SOLUTION INTRAMUSCULAR at 15:58

## 2024-05-23 RX ADMIN — SUGAMMADEX 200 MG: 100 INJECTION, SOLUTION INTRAVENOUS at 16:05

## 2024-05-23 RX ADMIN — DEXAMETHASONE SODIUM PHOSPHATE 8 MG: 4 INJECTION, SOLUTION INTRAMUSCULAR; INTRAVENOUS at 13:13

## 2024-05-23 RX ADMIN — ONDANSETRON 4 MG: 2 INJECTION INTRAMUSCULAR; INTRAVENOUS at 14:20

## 2024-05-23 RX ADMIN — FENTANYL CITRATE 100 MCG: 50 INJECTION, SOLUTION INTRAMUSCULAR; INTRAVENOUS at 13:09

## 2024-05-23 RX ADMIN — FENTANYL CITRATE 50 MCG: 50 INJECTION, SOLUTION INTRAMUSCULAR; INTRAVENOUS at 14:46

## 2024-05-23 ASSESSMENT — LIFESTYLE VARIABLES: SMOKING_STATUS: 1

## 2024-05-23 ASSESSMENT — PAIN SCALES - GENERAL
PAINLEVEL_OUTOF10: 0
PAINLEVEL_OUTOF10: 6

## 2024-05-23 ASSESSMENT — PAIN DESCRIPTION - ORIENTATION: ORIENTATION: INNER

## 2024-05-23 ASSESSMENT — PAIN DESCRIPTION - LOCATION: LOCATION: HEAD

## 2024-05-23 ASSESSMENT — PAIN - FUNCTIONAL ASSESSMENT
PAIN_FUNCTIONAL_ASSESSMENT: 0-10
PAIN_FUNCTIONAL_ASSESSMENT: PREVENTS OR INTERFERES SOME ACTIVE ACTIVITIES AND ADLS

## 2024-05-23 ASSESSMENT — PAIN DESCRIPTION - DESCRIPTORS: DESCRIPTORS: ACHING

## 2024-05-23 NOTE — DISCHARGE INSTRUCTIONS
Follow up with Dr. Mcintosh in 7 days [phone]     Open Mouth and cover when sneezing, coughing  No nose blowing for 2 weeks  Nasal saline spray in each nostril 4-5 times a day  Take medicines as directed  Ice to back of neck for comfort  Sleep with head elevated 30 degrees for the next few days      Endoscopic Sinus Surgery: What to Expect at Home  Your Recovery  You will have a drip pad under your nose to collect mucus and blood. Change it only when it bleeds through. You may have to do this every hour for 24 hours after surgery.  You may have some swelling of your nose, upper lip, cheeks, or around your eyes. Your nose may be sore and will bleed. You may feel \"stuffed up\" like you have a bad head cold. This will last for several days after surgery.  The tip of your nose and your upper lip and gums may be numb. Feeling will return in a few weeks to a few months. Your sense of smell will not be as good after surgery. It will improve and probably return to normal in 1 to 2 months.  You will probably be able to return to work or school in about 1 week and to your normal routine in about 3 weeks. However, this varies with your job and the extent of your surgery. Most people feel normal in 1 to 2 months.  You will have to visit your doctor regularly for 3 to 4 months after your surgery. Your doctor will check to see that your sinuses are healing well.  This care sheet gives you a general idea about how long it will take for you to recover. But each person recovers at a different pace. Follow the steps below to get better as quickly as possible.  How can you care for yourself at home?  Activity  Rest when you feel tired. Getting enough sleep will help you recover. Do not lie flat. Raise your head with three or four pillows. This can reduce swelling. Try to sleep on your back during the month after surgery. You can also sleep in a reclining chair.  Try to walk each day. Start by walking a little more than you did  the day before. Bit by bit, increase the amount you walk. Walking boosts blood flow and helps prevent pneumonia and constipation. Also, try to sit and stand as much as you can.  For 1 week, try not to bend over or lift anything heavier than 10 pounds. This may include a child, heavy grocery bags and milk containers, a heavy briefcase or backpack, cat litter or dog food bags, or a vacuum .  You can take a shower or bath. Use lukewarm, not hot water. Avoid swimming for 6 weeks.  Avoid sawdust, chemicals, and excessive dust for 4 weeks.  Avoid strenuous activities, such as biking, jogging, weight lifting, or aerobic exercise, for 1 week and then ease back into these activities over 2 to 3 weeks.  You may drive when you are no longer taking prescription pain medicine and feel up to it.  You will probably be able to return to work or school in about 1 week and to your normal routine in about 3 weeks. However, this varies with your job and the extent of your surgery.  Diet  You can eat your normal diet. If your stomach is upset, try bland, low-fat foods like plain rice, broiled chicken, toast, and yogurt.  You may notice that your bowel movements are not regular right after your surgery. This is common. Try to avoid constipation and straining with bowel movements. You may want to take a fiber supplement every day. If you have not had a bowel movement after a couple of days, ask your doctor about taking a mild laxative.  Medicines  Do not take aspirin, aspirin-containing medicines, or anti-inflammatory medicines such as ibuprofen (Advil, Motrin) or naproxen (Aleve) for 3 weeks following surgery unless your doctor says it is okay.  Take pain medicines exactly as directed.  If the doctor gave you a prescription medicine for pain, take it as prescribed.  Do not take two or more pain medicines at the same time unless the doctor told you to. Many pain medicines have acetaminophen, which is Tylenol. Too much acetaminophen  (Tylenol) can be harmful.  If your doctor prescribed antibiotics, take them as directed. Do not stop taking them just because you feel better. You need to take the full course of antibiotics.  If you think your pain medicine is making you sick to your stomach:  Take your medicine after meals (unless your doctor has told you not to).  Ask your doctor for a different pain medicine.  Incision care  You probably will not have an incision (cut). You will have a drip pad under your nose to collect blood. Change it only when it has bled through. You may have to do this every hour for 24 hours after surgery. When bleeding stops, you can remove it.  If you have packing in your nose, leave it in. Your doctor will take it out.  Ice and elevation  To help with swelling and pain, put ice or a cold pack on your nose for 10 to 20 minutes at a time. Put a thin cloth between the ice and your skin.  Do not sleep flat. Sleep with your head raised up. You can also sleep in a reclining chair.  Other instructions  Do not blow your nose for 2 weeks.  Do not put anything into your nose.  If you must sneeze, open your mouth and sneeze naturally.  Keep your mouth clean. Rinse your mouth with salt water or an alcohol-free mouthwash after each meal and before bedtime.  After any packing is removed, use saline (saltwater) nasal washes to help keep your nasal passages open and wash out mucus and bacteria. You can buy saline nose drops at a grocery store or drugstore.  You can wear your glasses when you wish. Do not wear contacts until the day after the surgery.  Do not travel by airplane for at least 2 weeks. Your sinuses are still healing, and the changes in air pressure can affect them.  Follow-up care is a key part of your treatment and safety. Be sure to make and go to all appointments, and call your doctor if you are having problems. It's also a good idea to know your test results and keep a list of the medicines you take.  When should you

## 2024-05-23 NOTE — ANESTHESIA POSTPROCEDURE EVALUATION
Department of Anesthesiology  Postprocedure Note    Patient: Claudia Martines  MRN: [id number]  YOB: 1995  Date of evaluation: 5/23/2024    Procedure Summary       Date: 05/23/24 Room / Location: [address]    Anesthesia Start: 1300 Anesthesia Stop: 1615    Procedure: SINUS FUNCTIONAL ENDOSCOPIC SURGERY, SEPTOPLASTY, SUBMUCOUS RESECTION INFERIOR TURBINATES, WILL GRAFTS (Bilateral: Nose) Diagnosis:       Chronic maxillary sinusitis      (Chronic maxillary sinusitis [J32.0])    Surgeons: Eric Mcintosh DO Responsible Provider: Vinod Russell DO    Anesthesia Type: general ASA Status: 2            Anesthesia Type: No value filed.    Taj Phase I: Taj Score: 10    Taj Phase II: Taj Score: 10    Anesthesia Post Evaluation    Patient location during evaluation: PACU  Patient participation: complete - patient participated  Level of consciousness: awake  Airway patency: patent  Nausea & Vomiting: no nausea and no vomiting  Cardiovascular status: hemodynamically stable  Respiratory status: acceptable  Hydration status: stable  Pain management: adequate    No notable events documented.

## 2024-05-23 NOTE — ANESTHESIA PRE PROCEDURE
Department of Anesthesiology  Preprocedure Note       Name:  Claudia Martines   Age:  28 y.o.  :  1995                                          MRN:  [id number]         Date:  2024      Surgeon: Surgeon(s):  Eric Mcintosh DO    Procedure: Procedure(s):  SINUS FUNCTIONAL ENDOSCOPIC SURGERY, SEPTOPLASTY, SUBMUCOUS RESECTION INFERIOR TURBINATES, WILL GRAFTS    Medications prior to admission:   Prior to Admission medications    Medication Sig Start Date End Date Taking? Authorizing Provider   cetirizine (ZYRTEC) 10 MG tablet Take 1 tablet by mouth daily    ProviderStarla MD   azelastine (ASTELIN) 0.1 % nasal spray 2 sprays by Nasal route 2 times daily Use in each nostril as directed 24   Darwin Win APRN - CNP   fluticasone (FLONASE) 50 MCG/ACT nasal spray 2 sprays by Nasal route daily 2 sprays each nostril once daily 24   Darwin Win APRN - CNP   lisdexamfetamine (VYVANSE) 30 MG capsule Take 2 capsules by mouth every morning. Instructed to take morning of surgery with a sip of water    ProviderStarla MD       Current medications:    Current Facility-Administered Medications   Medication Dose Route Frequency Provider Last Rate Last Admin    sodium chloride flush 0.9 % injection 5-40 mL  5-40 mL IntraVENous 2 times per day Mcghee, Jose, DO        sodium chloride flush 0.9 % injection 5-40 mL  5-40 mL IntraVENous PRN Mcghee, Jose, DO        0.9 % sodium chloride infusion   IntraVENous PRN Mcghee, Jose, DO           Allergies:    Allergies   Allergen Reactions    Onion Rash       Problem List:    Patient Active Problem List   Diagnosis Code    Attention deficit disorder (ADD) without hyperactivity F98.8    Chronic tonsillitis J35.01    Chronic maxillary sinusitis J32.0       Past Medical History:        Diagnosis Date    ADHD (attention deficit hyperactivity disorder)     Peritonsillar abscess     Recurrent sinus infections        Past Surgical History:        Procedure Laterality Date

## 2024-05-23 NOTE — BRIEF OP NOTE
Brief Postoperative Note      Patient: Claudia Martines  YOB: 1995  MRN: [id number]    Date of Procedure: 5/23/2024    Pre-Op Diagnosis Codes:     * Chronic maxillary sinusitis [J32.0]    Post-Op Diagnosis: Same       Procedure(s):  SINUS FUNCTIONAL ENDOSCOPIC SURGERY, SEPTOPLASTY, SUBMUCOUS RESECTION INFERIOR TURBINATES, WILL GRAFTS    Surgeon(s):  Eric Mcintosh DO    Assistant:  Resident: Jose Mcghee DO    Anesthesia: General    Estimated Blood Loss (mL): Minimal    Complications: None    Specimens:   ID Type Source Tests Collected by Time Destination   A : NASAL SEPTAL CONTENTS Tissue Tissue SURGICAL PATHOLOGY Eric Mcintosh DO 5/23/2024 1420        Implants:  Implant Name Type Inv. Item Serial No.  Lot No. LRB No. Used Action   STENT NSL 370UG MOMETASONE FUROATE BIOABSRB IMPL W/ DEL SYS - [id number]  STENT NSL 370UG MOMETASONE FUROATE BIOABSRB IMPL W/ DEL SYS  [employer]WD [id number] N/A 2 Implanted   STENT NSL L16MM DIA4MM 370UG MINI MOMETASONE FUROATE LO - [id number]  STENT NSL L16MM DIA4MM 370UG MINI MOMETASONE FUROATE LO  [employer]WD [id number] Left 1 Implanted   STENT NSL L16MM DIA4MM 370UG MINI MOMETASONE FUROATE LO - [id number]  STENT NSL L16MM DIA4MM 370UG MINI MOMETASONE FUROATE LO  [employer]-WD [id number] Right 1 Implanted         Drains: * No LDAs found *    Findings:  Infection Present At Time Of Surgery (PATOS) (choose all levels that have infection present):  No infection present      Electronically signed by Jose Mcghee DO on 5/23/2024 at 4:04 PM

## 2024-05-23 NOTE — H&P
Subjective:      Patient ID:  Claudia Martines is a 28 y.o. female.     HPI:  Pt returns for recheck of nasal obstruction.        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  Unable to use breathing aid for testing though breathe rite strips do help.      The patient is complaining of nasal congestion and PND     The patients worst time of year is all seasons        Patient's medications, allergies, past medical, surgical, social and family histories were reviewed and updated as appropriate.        Review of Systems   Constitutional:  Negative for chills and fever.   HENT:  Positive for congestion and postnasal drip. Negative for ear discharge, ear pain, hearing loss, rhinorrhea, sinus pressure, sneezing and sore throat.    Eyes:  Negative for pain and discharge.   Respiratory:  Negative for cough and shortness of breath.    Cardiovascular:  Negative for chest pain.   Gastrointestinal:  Negative for diarrhea and vomiting.   Genitourinary:  Negative for flank pain.   Musculoskeletal:  Negative for back pain and neck pain.   Skin:  Negative for rash.   Allergic/Immunologic: Negative.    Neurological:  Negative for syncope and headaches.   All other systems reviewed and are negative.        Objective:          Vitals:     05/08/24 1110   BP: 127/88   Pulse: 93   Temp: 97.2 °F (36.2 °C)   SpO2: 99%      Physical Exam  Vitals reviewed.   Constitutional:       Appearance: Normal appearance.   HENT:      Head: Normocephalic and atraumatic.      Jaw: There is normal jaw occlusion. No tenderness.      Right Ear: Tympanic membrane, ear canal and external ear

## 2024-05-23 NOTE — H&P
Claudia Martines was seen and re-examined preoperatively today, May 23, 2024.  There was no substantial change in her physical and medical status.  Patient is fit for the proposed surgical procedure.  All questions were appropriately addressed and had no further questions regarding the risks, benefits, and alternatives of the procedure.  Claudia Martines and family wished to proceed.    Jose Mcghee DO  Resident Physician  [facility]  Otolaryngology Residency  5/23/2024  11:20 AM

## 2024-05-24 NOTE — OP NOTE
Operative Note      Patient: Claudia Martines  YOB: 1995  MRN: [id number]    Date of Procedure: 5/23/2024    Pre-Op Diagnosis Codes:     * Chronic maxillary sinusitis [J32.0]    Post-Op Diagnosis: Same       SINUS FUNCTIONAL ENDOSCOPIC SURGERY, SEPTOPLASTY, SUBMUCOUS RESECTION INFERIOR TURBINATES    Surgeon(s):  Eric Mcintosh DO    Assistant:   Resident: Jose Mcghee DO    Anesthesia: General    Estimated Blood Loss (mL): Minimal    Complications: None    Specimens:   ID Type Source Tests Collected by Time Destination   A : NASAL SEPTAL CONTENTS Tissue Tissue SURGICAL PATHOLOGY Eric Mcintosh DO 5/23/2024 1420        Implants:  Implant Name Type Inv. Item Serial No.  Lot No. LRB No. Used Action   STENT NSL 370UG MOMETASONE FUROATE BIOABSRB IMPL W/ DEL SYS - [id number]  STENT NSL 370UG MOMETASONE FUROATE BIOABSRB IMPL W/ DEL SYS  [employer]SE[employer] E[employer]-WD [id number] N/A 2 Implanted   STENT NSL L16MM DIA4MM 370UG MINI MOMETASONE FUROATE LO - [id number]  STENT NSL L16MM DIA4MM 370UG MINI MOMETASONE FUROATE LO  INTERSE[employer] E[employer]-WD [id number] Left 1 Implanted   STENT NSL L16MM DIA4MM 370UG MINI MOMETASONE FUROATE LO - [id number]  STENT NSL L16MM DIA4MM 370UG MINI MOMETASONE FUROATE LO  INTERSE[employer] ENT-WD [id number] Right 1 Implanted         Drains: * No LDAs found *    Findings:  Infection Present At Time Of Surgery (PATOS) (choose all levels that have infection present):  No infection present    Detailed Description of Procedure:     Prep  The patient was consented preoperatively and taken back to the operating room, identified appropriately, placed in the supine position, given anesthesia for general intubation. The patient was intubated. They were prepped and draped in a sterile fashion. Initial prep for the nose was 4% cocaine pledgets placed into both nasal cavities and the patient's nasal walls medial and lateral along the septal line and then along the inferior turbinate were injected  the area of the supreme turbinate along the posterior nasopharyngeal wall approximately 1 cm above the nasal choanae on the left side was seen the beginning of the sphenoid sinus. This was somewhat atretic and the sinus was maybe 1 to 2 mm at best. Using the Ac[employer] functional sphenoid guide, the guide was placed along the same path as the endoscope and placed right up to the area where the sphenoid sinus could be seen. The lighted guidewire was then placed into the sphenoid sinus with manipulation. Once it was in the sphenoid sinus, a 6-mm balloon was then allowed to pass over the area of the sphenoid sinus until the balloon straddled both sides of the sinus ostium. The balloon was then taken up to 12 mmHg pressure with water. This was allowed to sit for 3 seconds and was then taken down. The resulting ostium after balloon dilation of the sphenoid sinus was very large and allowed for visualization of the sphenoid sinus itself.  This area was irrigated out with 180 ml of Saline.  Then suction to remove residual irrigation.  The endoscope and balloon were then removed    Frontals  LEFT:  Attention was then turned to the frontals starting with the left side. Using the [place], the patient's middle turbinate was medialized until the ethmoid and uncinate process were in full view. Injections were placed at the root of the middle turbinate and approximately 1 to 2 mL were placed in the root of the middle turbinate for anesthesia as well as to control bleeding. The frontal sinus guide from [email or web address] was then used to find the patient's   frontal sinus. The guidewire was used and I was visually able to pass the guidewire into the patient's frontal sinus on the left side. This was clearly visible as the light was found just under the patient's brow in the area of the frontal sinus. A guidewire was left in place. The balloon was pushed over it until the balloon was straddling the frontal recess. The 6-mm balloon was then used  have a vishal bullosa which was not reduced to allow access into the Osteomeatal complex. The maxillary sinus guidewire was used to go to the posterior aspect of the uncinate. The tip of the guidewire was placed just lateral to the uncinate process and the guidewire was advanced. Once the guidewire was placed across the ostium, visualization was found by light in the left maxillary sinus. The balloon was advanced over the ostium and on the inflation to 12 mm of pressure. The balloon did open up the uncinate process as well and the bowing of this uncinate process medially showed that we were in the appropriate position. This was allowed to hold for 3 seconds and then taken down. The patient's sinus was then irrigated out as well with 180 mL of normal saline and then the balloon and guidewire were withdrawn. The maxillary seeker was removed from the nose.     RIGHT:  With the maxillary sinuses, starting on the right side, the patient was not found to have an accessory os. The patient was not also found to have a vishal bullosa which was not reduced to allow access into the Osteomeatal complex. The maxillary sinus guidewire was used to go to the posterior aspect of the uncinate. The tip of the guidewire was placed just lateral to the uncinate process and the guidewire was advanced. Once the guidewire was placed across the ostium, visualization was found by light in the right maxillary sinus. The balloon was advanced over the ostium and on the inflation to 12 mm of pressure. The balloon did open up the uncinate process as well and the bowing of this uncinate process medially showed that we were in the appropriate position. This was allowed to hold for 3 seconds and then taken down. The patient's sinus was then irrigated out as well with 180 mL of normal saline and then the balloon and guidewire were withdrawn. The maxillary seeker was removed from the nose.     SMRITS  The bilateral inferior turbinates were medialized

## 2024-05-28 ENCOUNTER — TELEPHONE (OUTPATIENT)
Dept: ENT CLINIC | Age: 29
End: 2024-05-28

## 2024-05-28 NOTE — TELEPHONE ENCOUNTER
Patient called into the office states experiencing facial pain and pressure, headache, nausea advised to use zofran, rotate tylenol and pain medication as needed use as directed. Advised to continue nasal saline as there are splints in nasal cavity patient will get splints out in 1 week at post op appointment. Patient will start using the tylenol, saline and zofran.

## 2024-05-31 ENCOUNTER — OFFICE VISIT (OUTPATIENT)
Dept: ENT CLINIC | Age: 29
End: 2024-05-31

## 2024-05-31 VITALS
OXYGEN SATURATION: 97 % | SYSTOLIC BLOOD PRESSURE: 118 MMHG | DIASTOLIC BLOOD PRESSURE: 85 MMHG | HEART RATE: 123 BPM | WEIGHT: 139 LBS | HEIGHT: 61 IN | BODY MASS INDEX: 26.24 KG/M2 | TEMPERATURE: 98 F

## 2024-05-31 DIAGNOSIS — J32.4 CHRONIC PANSINUSITIS: ICD-10-CM

## 2024-05-31 DIAGNOSIS — Z98.890 S/P FESS (FUNCTIONAL ENDOSCOPIC SINUS SURGERY): Primary | ICD-10-CM

## 2024-05-31 PROCEDURE — 99024 POSTOP FOLLOW-UP VISIT: CPT | Performed by: OTOLARYNGOLOGY

## 2024-05-31 NOTE — PROGRESS NOTES
5/31/2024    Subjective:    Patient ID:  Claudia Martines is a 28 y.o. female.   HPI Comments: Pt returns today for followup, s/p FESS, Septo, SMRITS and review of path report. Pt is  doing well. There are not problems from the surgical site. Pt is congested from splints. No bleeding.      Review of Systems   HENT: Positive for congestion and rhinorrhea.   All other systems reviewed and are negative.     Objective:    Physical Exam   Constitutional: She is oriented to person, place, and time. She appears well-developed and well-nourished.   HENT:   Head: Normocephalic and atraumatic.   Right Ear: Hearing, tympanic membrane, external ear and ear canal normal.   Left Ear: Hearing, tympanic membrane, external ear and ear canal normal.   Nose: Rhinorrhea present.   Mouth/Throat: Uvula is midline, oropharynx is clear and moist and mucous membranes are normal.   Septal splints in place - removed with no difficulty.     Septum is straight    Crusting removed from both nasal cavities.   Eyes: Conjunctivae and EOM are normal. Pupils are equal, round, and reactive to light.   Neck: Normal range of motion. Neck supple.   Cardiovascular: Normal rate, regular rhythm and normal heart sounds.   Pulmonary/Chest: Effort normal and breath sounds normal.   Abdominal: Soft. Bowel sounds are normal.   Neurological: She is alert and oriented to person, place, and time.   Vitals reviewed.         Assessment:       Diagnosis Orders   1. S/P FESS (functional endoscopic sinus surgery)        2. Chronic pansinusitis              Plan:      I want patient to follow up in 4 months, continue saline rinses discontinue flonase.     No blowing nose only sniffing.  Patient may call if they feel they're getting a sinus infection and I will start her   on antibiotic.    Follow up in 4 month(s)

## 2024-06-03 ENCOUNTER — TELEPHONE (OUTPATIENT)
Dept: ENT CLINIC | Age: 29
End: 2024-06-03

## 2024-06-03 LAB — SURGICAL PATHOLOGY REPORT: NORMAL

## 2024-06-03 NOTE — TELEPHONE ENCOUNTER
Called patient to discuss current symptoms patient is using the nasal saline, advised no flonase, patient feels packing could be coming out still bleeding some advised to use the nasal saline more frequently and takes up to 4-6 months to fully heal.

## 2024-06-03 NOTE — TELEPHONE ENCOUNTER
Patient's girlfriend Angelica MEHTA requesting return call. States patient just had her splints removed after surgery and now patient is complaining of right sided pain, congestion, bleeding. Angelica can be reached at [phone].    Electronically signed by Donna Amin MA on 6/3/24 at 10:03 AM EDT

## 2024-06-05 PROBLEM — J34.2 DNS (DEVIATED NASAL SEPTUM): Status: ACTIVE | Noted: 2024-06-05

## 2024-06-05 PROBLEM — J34.3 HYPERTROPHY OF INFERIOR NASAL TURBINATE: Status: ACTIVE | Noted: 2024-06-05

## (undated) DEVICE — BLADE 1884380HR QUADCUT 5PK 4.3MM X 13CM: Brand: QUADCUT®

## (undated) DEVICE — KIT,ANTI FOG,W/SPONGE & FLUID,SOFT PACK: Brand: MEDLINE

## (undated) DEVICE — 4-PORT MANIFOLD: Brand: NEPTUNE 2

## (undated) DEVICE — BALLOON SINUPLASTY 6X16 MM SYS RELIEVA SPINPLUS

## (undated) DEVICE — NEEDLE SPNL 22GA L3.5IN BLK HUB S STL REG WALL FIT STYL

## (undated) DEVICE — DUAL LUMEN STOMACH TUBE: Brand: SALEM SUMP

## (undated) DEVICE — DOUBLE BASIN SET: Brand: MEDLINE INDUSTRIES, INC.

## (undated) DEVICE — CATHETER,RED SUCT,POLY-CATH,10: Brand: MEDLINE INDUSTRIES, INC.

## (undated) DEVICE — COUNTER NDL 30 COUNT DBL MAG

## (undated) DEVICE — SYRINGE MED 50ML LUERLOCK TIP

## (undated) DEVICE — SET T AND A PASCOLINI

## (undated) DEVICE — SPONGE,TONSIL,DBL STRNG,XRAY,MED,1",STRL: Brand: MEDLINE INDUSTRIES, INC.

## (undated) DEVICE — SOLUTION IRRIG 1000ML STRL H2O USP PLAS POUR BTL

## (undated) DEVICE — SOLUTION IV IRRIG 500ML 0.9% SODIUM CHL 2F7123

## (undated) DEVICE — SOLUTION IRRIG 1000ML 0.9% SOD CHL USP POUR PLAS BTL

## (undated) DEVICE — TOWEL,OR,DSP,ST,BLUE,STD,6/PK,12PK/CS: Brand: MEDLINE

## (undated) DEVICE — SOLUTION IV 500ML 0.9% SOD CHL PH 5 INJ USP VIAFLX PLAS

## (undated) DEVICE — GOWN,SIRUS,FABRNF,2XL,18/CS: Brand: MEDLINE

## (undated) DEVICE — BLADE,STAINLESS-STEEL,15,STRL,DISPOSABLE: Brand: MEDLINE

## (undated) DEVICE — SHEATH 1912010 5PK 4MM/30DEG STORZ XOMED: Brand: ENDO-SCRUB®

## (undated) DEVICE — GLOVE ORANGE PI 8 1/2   MSG9085

## (undated) DEVICE — SURGICAL PROCEDURE PACK EENT CUST

## (undated) DEVICE — NEEDLE FLTR 18GA L1.5IN MEM THK5UM BLNT DISP

## (undated) DEVICE — KIT OPHTHLMC W/7.3CM X 7.3CM INSTRMNT WIPE 0.4CM X 15CM EYE

## (undated) DEVICE — TUBING 1912030 ENDO-SCRUB 2 5PK: Brand: ENDO-SCRUB®

## (undated) DEVICE — SYRINGE, LUER LOCK, 10ML: Brand: MEDLINE

## (undated) DEVICE — EVAC 70 XTRA WAND: Brand: COBLATION

## (undated) DEVICE — BASIC SINGLE BASIN 1-LF: Brand: MEDLINE INDUSTRIES, INC.

## (undated) DEVICE — ELECTRODE PT RET AD L9FT HI MOIST COND ADH HYDRGEL CORDED

## (undated) DEVICE — MAGNETIC INSTR DRAPE 20X16: Brand: MEDLINE INDUSTRIES, INC.

## (undated) DEVICE — SINU FOAM: Brand: SINU-FOAM

## (undated) DEVICE — DEVICE INFL PRSS G INDIC DISP (MUST BE PURC IN MULTIPLES OF 5)

## (undated) DEVICE — LIGHT SOURCE WHT

## (undated) DEVICE — PROPEL MINI SINUS IMPLANT
Type: IMPLANTABLE DEVICE | Site: NOSE | Status: NON-FUNCTIONAL
Brand: PROPEL MINI

## (undated) DEVICE — NEEDLE HYPO 25GA L1.5IN BLU POLYPR HUB S STL REG BVL STR

## (undated) DEVICE — TUBING, SUCTION, 3/16" X 12', STRAIGHT: Brand: MEDLINE

## (undated) DEVICE — CODMAN® SURGICAL PATTIES 1/2" X 3" (1.27CM X 7.62CM): Brand: CODMAN®

## (undated) DEVICE — GOWN,SIRUS,FABRNF,L,20/CS: Brand: MEDLINE

## (undated) DEVICE — SPONGE GZ W4XL4IN RAYON POLY CVR W/NONWOVEN FAB STRL 2/PK

## (undated) DEVICE — SYRINGE,EAR/ULCER, 2 OZ, STERILE: Brand: MEDLINE

## (undated) DEVICE — MARKER,SKIN,WI/RULER AND LABELS: Brand: MEDLINE

## (undated) DEVICE — SYRINGE,CONTROL,LL,FINGER,GRIP: Brand: MEDLINE INDUSTRIES, INC.